# Patient Record
Sex: FEMALE | Race: WHITE | NOT HISPANIC OR LATINO | Employment: FULL TIME | URBAN - NONMETROPOLITAN AREA
[De-identification: names, ages, dates, MRNs, and addresses within clinical notes are randomized per-mention and may not be internally consistent; named-entity substitution may affect disease eponyms.]

---

## 2018-07-08 ENCOUNTER — HOSPITAL ENCOUNTER (INPATIENT)
Facility: HOSPITAL | Age: 38
LOS: 2 days | Discharge: HOME | End: 2018-07-10
Attending: EMERGENCY MEDICINE | Admitting: INTERNAL MEDICINE

## 2018-07-08 ENCOUNTER — APPOINTMENT (OUTPATIENT)
Dept: GENERAL RADIOLOGY | Facility: HOSPITAL | Age: 38
End: 2018-07-08

## 2018-07-08 DIAGNOSIS — A41.9 SEPSIS SECONDARY TO UTI (HCC): Primary | ICD-10-CM

## 2018-07-08 DIAGNOSIS — N39.0 SEPSIS SECONDARY TO UTI (HCC): Primary | ICD-10-CM

## 2018-07-08 LAB
6-ACETYL MORPHINE: NEGATIVE
ALBUMIN SERPL-MCNC: 3.4 G/DL (ref 3.5–5)
ALBUMIN/GLOB SERPL: 1.2 G/DL (ref 1.5–2.5)
ALP SERPL-CCNC: 248 U/L (ref 35–104)
ALT SERPL W P-5'-P-CCNC: 113 U/L (ref 10–36)
AMPHET+METHAMPHET UR QL: NEGATIVE
ANION GAP SERPL CALCULATED.3IONS-SCNC: 6 MMOL/L (ref 3.6–11.2)
AST SERPL-CCNC: 94 U/L (ref 10–30)
B-HCG UR QL: NEGATIVE
BACTERIA UR QL AUTO: ABNORMAL /HPF
BARBITURATES UR QL SCN: NEGATIVE
BENZODIAZ UR QL SCN: NEGATIVE
BILIRUB SERPL-MCNC: 0.9 MG/DL (ref 0.2–1.8)
BILIRUB UR QL STRIP: NEGATIVE
BUN BLD-MCNC: 16 MG/DL (ref 7–21)
BUN/CREAT SERPL: 21.1 (ref 7–25)
BUPRENORPHINE SERPL-MCNC: POSITIVE NG/ML
CALCIUM SPEC-SCNC: 8.5 MG/DL (ref 7.7–10)
CANNABINOIDS SERPL QL: NEGATIVE
CHLORIDE SERPL-SCNC: 108 MMOL/L (ref 99–112)
CLARITY UR: ABNORMAL
CO2 SERPL-SCNC: 24 MMOL/L (ref 24.3–31.9)
COCAINE UR QL: NEGATIVE
COLOR UR: ABNORMAL
CREAT BLD-MCNC: 0.76 MG/DL (ref 0.43–1.29)
CRP SERPL-MCNC: 10.73 MG/DL (ref 0–0.99)
D-LACTATE SERPL-SCNC: 1.2 MMOL/L (ref 0.5–2)
D-LACTATE SERPL-SCNC: 2.6 MMOL/L (ref 0.5–2)
DEPRECATED RDW RBC AUTO: 47.3 FL (ref 37–54)
ERYTHROCYTE [DISTWIDTH] IN BLOOD BY AUTOMATED COUNT: 13.9 % (ref 11.5–14.5)
ETHANOL BLD-MCNC: <10 MG/DL
ETHANOL UR QL: <0.01 %
FLUAV AG NPH QL: NEGATIVE
FLUBV AG NPH QL IA: NEGATIVE
GFR SERPL CREATININE-BSD FRML MDRD: 87 ML/MIN/1.73
GLOBULIN UR ELPH-MCNC: 2.8 GM/DL
GLUCOSE BLD-MCNC: 110 MG/DL (ref 70–110)
GLUCOSE UR STRIP-MCNC: NEGATIVE MG/DL
HAV IGM SERPL QL IA: ABNORMAL
HBV CORE IGM SERPL QL IA: ABNORMAL
HBV SURFACE AG SERPL QL IA: ABNORMAL
HCT VFR BLD AUTO: 36.5 % (ref 37–47)
HCV AB SER DONR QL: REACTIVE
HETEROPH AB SER QL LA: NEGATIVE
HGB BLD-MCNC: 12.4 G/DL (ref 12–16)
HGB UR QL STRIP.AUTO: ABNORMAL
HIV1+2 AB SER QL: NORMAL
HOLD SPECIMEN: NORMAL
HYALINE CASTS UR QL AUTO: ABNORMAL /LPF
KETONES UR QL STRIP: NEGATIVE
LEUKOCYTE ESTERASE UR QL STRIP.AUTO: ABNORMAL
LYMPHOCYTES # BLD MANUAL: 0.63 10*3/MM3 (ref 1–3)
LYMPHOCYTES NFR BLD MANUAL: 6 % (ref 21–51)
MAGNESIUM SERPL-MCNC: 1.7 MG/DL (ref 1.7–2.6)
MCH RBC QN AUTO: 31.9 PG (ref 27–33)
MCHC RBC AUTO-ENTMCNC: 34 G/DL (ref 33–37)
MCV RBC AUTO: 93.8 FL (ref 80–94)
METHADONE UR QL SCN: NEGATIVE
NEUTROPHILS # BLD AUTO: 9.79 10*3/MM3 (ref 1.4–6.5)
NEUTROPHILS NFR BLD MANUAL: 67 % (ref 30–70)
NEUTS BAND NFR BLD MANUAL: 27 % (ref 4–12)
NITRITE UR QL STRIP: POSITIVE
OPIATES UR QL: NEGATIVE
OSMOLALITY SERPL CALC.SUM OF ELEC: 277.5 MOSM/KG (ref 273–305)
OXYCODONE UR QL SCN: NEGATIVE
PCP UR QL SCN: NEGATIVE
PH UR STRIP.AUTO: 5.5 [PH] (ref 5–8)
PHOSPHATE SERPL-MCNC: 2.4 MG/DL (ref 2.7–4.5)
PLAT MORPH BLD: NORMAL
PLATELET # BLD AUTO: 123 10*3/MM3 (ref 130–400)
PMV BLD AUTO: 11 FL (ref 6–10)
POTASSIUM BLD-SCNC: 3.8 MMOL/L (ref 3.5–5.3)
PROT SERPL-MCNC: 6.2 G/DL (ref 6–8)
PROT UR QL STRIP: ABNORMAL
RBC # BLD AUTO: 3.89 10*6/MM3 (ref 4.2–5.4)
RBC # UR: ABNORMAL /HPF
RBC MORPH BLD: NORMAL
REF LAB TEST METHOD: ABNORMAL
S PYO AG THROAT QL: NEGATIVE
SCAN SLIDE: NORMAL
SODIUM BLD-SCNC: 138 MMOL/L (ref 135–153)
SP GR UR STRIP: 1.01 (ref 1–1.03)
SQUAMOUS #/AREA URNS HPF: ABNORMAL /HPF
T4 FREE SERPL-MCNC: 1.04 NG/DL (ref 0.89–1.76)
TROPONIN I SERPL-MCNC: <0.006 NG/ML
TSH SERPL DL<=0.05 MIU/L-ACNC: 0.91 MIU/ML (ref 0.55–4.78)
UROBILINOGEN UR QL STRIP: ABNORMAL
WBC NRBC COR # BLD: 10.42 10*3/MM3 (ref 4.5–12.5)
WBC UR QL AUTO: ABNORMAL /HPF
WHOLE BLOOD HOLD SPECIMEN: NORMAL
WHOLE BLOOD HOLD SPECIMEN: NORMAL

## 2018-07-08 PROCEDURE — 80074 ACUTE HEPATITIS PANEL: CPT | Performed by: EMERGENCY MEDICINE

## 2018-07-08 PROCEDURE — 87150 DNA/RNA AMPLIFIED PROBE: CPT | Performed by: EMERGENCY MEDICINE

## 2018-07-08 PROCEDURE — 71045 X-RAY EXAM CHEST 1 VIEW: CPT

## 2018-07-08 PROCEDURE — 94799 UNLISTED PULMONARY SVC/PX: CPT

## 2018-07-08 PROCEDURE — 87077 CULTURE AEROBIC IDENTIFY: CPT | Performed by: EMERGENCY MEDICINE

## 2018-07-08 PROCEDURE — 25010000002 PIPERACILLIN-TAZOBACTAM: Performed by: EMERGENCY MEDICINE

## 2018-07-08 PROCEDURE — 93005 ELECTROCARDIOGRAM TRACING: CPT | Performed by: EMERGENCY MEDICINE

## 2018-07-08 PROCEDURE — 99285 EMERGENCY DEPT VISIT HI MDM: CPT

## 2018-07-08 PROCEDURE — 84484 ASSAY OF TROPONIN QUANT: CPT | Performed by: EMERGENCY MEDICINE

## 2018-07-08 PROCEDURE — 80307 DRUG TEST PRSMV CHEM ANLYZR: CPT | Performed by: EMERGENCY MEDICINE

## 2018-07-08 PROCEDURE — 81025 URINE PREGNANCY TEST: CPT | Performed by: EMERGENCY MEDICINE

## 2018-07-08 PROCEDURE — 25010000002 KETOROLAC TROMETHAMINE PER 15 MG: Performed by: EMERGENCY MEDICINE

## 2018-07-08 PROCEDURE — G0432 EIA HIV-1/HIV-2 SCREEN: HCPCS | Performed by: EMERGENCY MEDICINE

## 2018-07-08 PROCEDURE — 85007 BL SMEAR W/DIFF WBC COUNT: CPT | Performed by: EMERGENCY MEDICINE

## 2018-07-08 PROCEDURE — 87880 STREP A ASSAY W/OPTIC: CPT | Performed by: EMERGENCY MEDICINE

## 2018-07-08 PROCEDURE — 84443 ASSAY THYROID STIM HORMONE: CPT | Performed by: EMERGENCY MEDICINE

## 2018-07-08 PROCEDURE — 87186 SC STD MICRODIL/AGAR DIL: CPT | Performed by: EMERGENCY MEDICINE

## 2018-07-08 PROCEDURE — 83605 ASSAY OF LACTIC ACID: CPT | Performed by: EMERGENCY MEDICINE

## 2018-07-08 PROCEDURE — 25010000002 VANCOMYCIN PER 500 MG: Performed by: EMERGENCY MEDICINE

## 2018-07-08 PROCEDURE — 87081 CULTURE SCREEN ONLY: CPT | Performed by: EMERGENCY MEDICINE

## 2018-07-08 PROCEDURE — 25010000002 VANCOMYCIN PER 500 MG

## 2018-07-08 PROCEDURE — 86308 HETEROPHILE ANTIBODY SCREEN: CPT | Performed by: EMERGENCY MEDICINE

## 2018-07-08 PROCEDURE — 80053 COMPREHEN METABOLIC PANEL: CPT | Performed by: EMERGENCY MEDICINE

## 2018-07-08 PROCEDURE — 71045 X-RAY EXAM CHEST 1 VIEW: CPT | Performed by: RADIOLOGY

## 2018-07-08 PROCEDURE — 81001 URINALYSIS AUTO W/SCOPE: CPT | Performed by: EMERGENCY MEDICINE

## 2018-07-08 PROCEDURE — 84439 ASSAY OF FREE THYROXINE: CPT | Performed by: EMERGENCY MEDICINE

## 2018-07-08 PROCEDURE — 25010000002 PIPERACILLIN-TAZOBACTAM

## 2018-07-08 PROCEDURE — C1751 CATH, INF, PER/CENT/MIDLINE: HCPCS

## 2018-07-08 PROCEDURE — 85025 COMPLETE CBC W/AUTO DIFF WBC: CPT | Performed by: EMERGENCY MEDICINE

## 2018-07-08 PROCEDURE — 36556 INSERT NON-TUNNEL CV CATH: CPT | Performed by: SURGERY

## 2018-07-08 PROCEDURE — 86140 C-REACTIVE PROTEIN: CPT | Performed by: EMERGENCY MEDICINE

## 2018-07-08 PROCEDURE — 87086 URINE CULTURE/COLONY COUNT: CPT | Performed by: EMERGENCY MEDICINE

## 2018-07-08 PROCEDURE — 84100 ASSAY OF PHOSPHORUS: CPT | Performed by: INTERNAL MEDICINE

## 2018-07-08 PROCEDURE — 83735 ASSAY OF MAGNESIUM: CPT | Performed by: EMERGENCY MEDICINE

## 2018-07-08 PROCEDURE — 99252 IP/OBS CONSLTJ NEW/EST SF 35: CPT | Performed by: SURGERY

## 2018-07-08 PROCEDURE — 25010000002 HEPARIN (PORCINE) PER 1000 UNITS: Performed by: PHYSICIAN ASSISTANT

## 2018-07-08 PROCEDURE — 99223 1ST HOSP IP/OBS HIGH 75: CPT | Performed by: INTERNAL MEDICINE

## 2018-07-08 PROCEDURE — 25010000002 ONDANSETRON PER 1 MG: Performed by: EMERGENCY MEDICINE

## 2018-07-08 PROCEDURE — 05H633Z INSERTION OF INFUSION DEVICE INTO LEFT SUBCLAVIAN VEIN, PERCUTANEOUS APPROACH: ICD-10-PCS | Performed by: SURGERY

## 2018-07-08 PROCEDURE — 87804 INFLUENZA ASSAY W/OPTIC: CPT | Performed by: EMERGENCY MEDICINE

## 2018-07-08 PROCEDURE — 87040 BLOOD CULTURE FOR BACTERIA: CPT | Performed by: EMERGENCY MEDICINE

## 2018-07-08 RX ORDER — SODIUM CHLORIDE 0.9 % (FLUSH) 0.9 %
10 SYRINGE (ML) INJECTION AS NEEDED
Status: DISCONTINUED | OUTPATIENT
Start: 2018-07-08 | End: 2018-07-10 | Stop reason: HOSPADM

## 2018-07-08 RX ORDER — SODIUM CHLORIDE 0.9 % (FLUSH) 0.9 %
20 SYRINGE (ML) INJECTION AS NEEDED
Status: DISCONTINUED | OUTPATIENT
Start: 2018-07-08 | End: 2018-07-10 | Stop reason: HOSPADM

## 2018-07-08 RX ORDER — KETOROLAC TROMETHAMINE 30 MG/ML
30 INJECTION, SOLUTION INTRAMUSCULAR; INTRAVENOUS ONCE
Status: COMPLETED | OUTPATIENT
Start: 2018-07-08 | End: 2018-07-08

## 2018-07-08 RX ORDER — MAGNESIUM SULFATE HEPTAHYDRATE 40 MG/ML
2 INJECTION, SOLUTION INTRAVENOUS AS NEEDED
Status: DISCONTINUED | OUTPATIENT
Start: 2018-07-08 | End: 2018-07-10 | Stop reason: HOSPADM

## 2018-07-08 RX ORDER — HYDROXYZINE 50 MG/1
50 TABLET, FILM COATED ORAL EVERY 6 HOURS PRN
Status: DISCONTINUED | OUTPATIENT
Start: 2018-07-08 | End: 2018-07-10 | Stop reason: HOSPADM

## 2018-07-08 RX ORDER — SODIUM CHLORIDE 9 MG/ML
100 INJECTION, SOLUTION INTRAVENOUS CONTINUOUS
Status: DISCONTINUED | OUTPATIENT
Start: 2018-07-08 | End: 2018-07-10 | Stop reason: HOSPADM

## 2018-07-08 RX ORDER — ACETAMINOPHEN 325 MG/1
650 TABLET ORAL EVERY 6 HOURS PRN
Status: DISCONTINUED | OUTPATIENT
Start: 2018-07-08 | End: 2018-07-10 | Stop reason: HOSPADM

## 2018-07-08 RX ORDER — ACETAMINOPHEN 325 MG/1
975 TABLET ORAL ONCE
Status: COMPLETED | OUTPATIENT
Start: 2018-07-08 | End: 2018-07-08

## 2018-07-08 RX ORDER — IBUPROFEN 400 MG/1
400 TABLET ORAL EVERY 6 HOURS PRN
Status: DISCONTINUED | OUTPATIENT
Start: 2018-07-08 | End: 2018-07-10 | Stop reason: HOSPADM

## 2018-07-08 RX ORDER — POTASSIUM CHLORIDE 7.45 MG/ML
10 INJECTION INTRAVENOUS
Status: DISCONTINUED | OUTPATIENT
Start: 2018-07-08 | End: 2018-07-10 | Stop reason: HOSPADM

## 2018-07-08 RX ORDER — SODIUM CHLORIDE 0.9 % (FLUSH) 0.9 %
1-10 SYRINGE (ML) INJECTION AS NEEDED
Status: DISCONTINUED | OUTPATIENT
Start: 2018-07-08 | End: 2018-07-10 | Stop reason: HOSPADM

## 2018-07-08 RX ORDER — NICOTINE 21 MG/24HR
1 PATCH, TRANSDERMAL 24 HOURS TRANSDERMAL DAILY
Status: DISCONTINUED | OUTPATIENT
Start: 2018-07-08 | End: 2018-07-10 | Stop reason: HOSPADM

## 2018-07-08 RX ORDER — ONDANSETRON 2 MG/ML
4 INJECTION INTRAMUSCULAR; INTRAVENOUS ONCE
Status: COMPLETED | OUTPATIENT
Start: 2018-07-08 | End: 2018-07-08

## 2018-07-08 RX ORDER — MAGNESIUM SULFATE HEPTAHYDRATE 40 MG/ML
4 INJECTION, SOLUTION INTRAVENOUS ONCE
Status: COMPLETED | OUTPATIENT
Start: 2018-07-08 | End: 2018-07-09

## 2018-07-08 RX ORDER — POTASSIUM CHLORIDE 20 MEQ/1
40 TABLET, EXTENDED RELEASE ORAL AS NEEDED
Status: DISCONTINUED | OUTPATIENT
Start: 2018-07-08 | End: 2018-07-10 | Stop reason: HOSPADM

## 2018-07-08 RX ORDER — MAGNESIUM SULFATE HEPTAHYDRATE 40 MG/ML
4 INJECTION, SOLUTION INTRAVENOUS AS NEEDED
Status: DISCONTINUED | OUTPATIENT
Start: 2018-07-08 | End: 2018-07-10 | Stop reason: HOSPADM

## 2018-07-08 RX ORDER — POTASSIUM CHLORIDE 1.5 G/1.77G
40 POWDER, FOR SOLUTION ORAL AS NEEDED
Status: DISCONTINUED | OUTPATIENT
Start: 2018-07-08 | End: 2018-07-10 | Stop reason: HOSPADM

## 2018-07-08 RX ORDER — HEPARIN SODIUM 5000 [USP'U]/ML
5000 INJECTION, SOLUTION INTRAVENOUS; SUBCUTANEOUS EVERY 12 HOURS SCHEDULED
Status: DISCONTINUED | OUTPATIENT
Start: 2018-07-08 | End: 2018-07-10 | Stop reason: HOSPADM

## 2018-07-08 RX ADMIN — SODIUM CHLORIDE 1770 ML: 9 INJECTION, SOLUTION INTRAVENOUS at 08:12

## 2018-07-08 RX ADMIN — MAGNESIUM SULFATE IN WATER 4 G: 40 INJECTION, SOLUTION INTRAVENOUS at 23:13

## 2018-07-08 RX ADMIN — SODIUM CHLORIDE 150 ML/HR: 9 INJECTION, SOLUTION INTRAVENOUS at 19:20

## 2018-07-08 RX ADMIN — ONDANSETRON 4 MG: 2 INJECTION, SOLUTION INTRAMUSCULAR; INTRAVENOUS at 08:13

## 2018-07-08 RX ADMIN — HEPARIN SODIUM 5000 UNITS: 5000 INJECTION, SOLUTION INTRAVENOUS; SUBCUTANEOUS at 13:18

## 2018-07-08 RX ADMIN — SODIUM CHLORIDE 150 ML/HR: 9 INJECTION, SOLUTION INTRAVENOUS at 09:20

## 2018-07-08 RX ADMIN — VANCOMYCIN HYDROCHLORIDE 1250 MG: 5 INJECTION, POWDER, LYOPHILIZED, FOR SOLUTION INTRAVENOUS at 09:45

## 2018-07-08 RX ADMIN — NICOTINE 1 PATCH: 21 PATCH TRANSDERMAL at 19:55

## 2018-07-08 RX ADMIN — SODIUM CHLORIDE 1000 ML: 9 INJECTION, SOLUTION INTRAVENOUS at 13:12

## 2018-07-08 RX ADMIN — KETOROLAC TROMETHAMINE 30 MG: 30 INJECTION, SOLUTION INTRAMUSCULAR; INTRAVENOUS at 08:13

## 2018-07-08 RX ADMIN — HYDROXYZINE HYDROCHLORIDE 50 MG: 50 TABLET ORAL at 23:30

## 2018-07-08 RX ADMIN — VANCOMYCIN HYDROCHLORIDE 750 MG: 5 INJECTION, POWDER, LYOPHILIZED, FOR SOLUTION INTRAVENOUS at 18:49

## 2018-07-08 RX ADMIN — SODIUM CHLORIDE 150 ML/HR: 9 INJECTION, SOLUTION INTRAVENOUS at 10:47

## 2018-07-08 RX ADMIN — TAZOBACTAM SODIUM AND PIPERACILLIN SODIUM 4.5 G: .5; 4 INJECTION, POWDER, LYOPHILIZED, FOR SOLUTION INTRAVENOUS at 18:01

## 2018-07-08 RX ADMIN — HEPARIN SODIUM 5000 UNITS: 5000 INJECTION, SOLUTION INTRAVENOUS; SUBCUTANEOUS at 21:13

## 2018-07-08 RX ADMIN — ACETAMINOPHEN 650 MG: 325 TABLET, FILM COATED ORAL at 23:30

## 2018-07-08 RX ADMIN — IBUPROFEN 400 MG: 400 TABLET, FILM COATED ORAL at 19:58

## 2018-07-08 RX ADMIN — TAZOBACTAM SODIUM AND PIPERACILLIN SODIUM 4.5 G: .5; 4 INJECTION, POWDER, LYOPHILIZED, FOR SOLUTION INTRAVENOUS at 08:51

## 2018-07-08 RX ADMIN — ACETAMINOPHEN 975 MG: 325 TABLET, FILM COATED ORAL at 08:13

## 2018-07-08 NOTE — PROCEDURES
Preop needs IV access for pressors    Postop same    Procedure place left subclavian central line    The left chest and neck were prepped and draped. The clavicle area injected with local. The vein accessed with a long needle and the wire threaded in easily. The dilator was placed and then the catheter sutured in at 18 cm. All ports had good blood return and flushed easily. The dressing was applied and an xray ordered.

## 2018-07-08 NOTE — CONSULTS
Consults    Patient Care Team:  PAPITO Pacheco as PCP - General  PAPITO Pacheco as PCP - Family Medicine    Chief complaint: hypotension, IV drug use    Subjective     History of Present Illness She is a 29 yo IV suboxone user who was admitted with fever, leg pain, and hypotension, who needs a central line for infusion of pressors since her blood pressure has remained low in spite of fluid boluses. She is not on any anticoagulants and has no past history off central lines or fractured clavicle/ribs.     Review of Systems   Constitutional: Positive for fatigue and fever. Negative for activity change, appetite change, chills and unexpected weight change.   HENT: Negative for congestion, facial swelling and sore throat.    Eyes: Negative for photophobia and visual disturbance.   Respiratory: Negative for chest tightness, shortness of breath and wheezing.    Cardiovascular: Negative for chest pain, palpitations and leg swelling.   Gastrointestinal: Negative for abdominal distention, abdominal pain, anal bleeding, blood in stool, constipation, diarrhea, nausea, rectal pain and vomiting.   Endocrine: Negative for cold intolerance, heat intolerance, polydipsia and polyuria.   Genitourinary: Negative for difficulty urinating, dysuria, flank pain and urgency.   Musculoskeletal: Positive for arthralgias and myalgias. Negative for back pain.   Skin: Negative for rash and wound.   Allergic/Immunologic: Negative for immunocompromised state.   Neurological: Negative for dizziness, seizures, syncope, light-headedness, numbness and headaches.   Hematological: Negative for adenopathy. Does not bruise/bleed easily.   Psychiatric/Behavioral: Negative for behavioral problems and confusion. The patient is not nervous/anxious.         Past Medical History:   Diagnosis Date   • Hepatitis C virus    , History reviewed. No pertinent surgical history., History reviewed. No pertinent family history.,   Social History    Substance Use Topics   • Smoking status: Current Every Day Smoker     Packs/day: 0.25     Years: 10.00     Types: Cigarettes   • Smokeless tobacco: Never Used   • Alcohol use Not on file   ,   No prescriptions prior to admission.   , Scheduled Meds:    heparin (porcine) 5,000 Units Subcutaneous Q12H   piperacillin-tazobactam 4.5 g Intravenous Q8H   sodium chloride 1,000 mL Intravenous Once   vancomycin 750 mg Intravenous Q8H   , Continuous Infusions:    norepinephrine 0.02-0.3 mcg/kg/min    Pharmacy Consult - Pharmacy to dose     Pharmacy Consult - Pharmacy to dose     sodium chloride 150 mL/hr Last Rate: 150 mL/hr (07/08/18 1047)   , PRN Meds:  Pharmacy Consult - Pharmacy to dose  •  Pharmacy Consult - Pharmacy to dose  •  pneumococcal polysaccharide 23-valent  •  sodium chloride  •  sodium chloride and Allergies:  Patient has no known allergies.    Objective      Vital Signs  Temp:  [97.5 °F (36.4 °C)-103 °F (39.4 °C)] 97.5 °F (36.4 °C)  Heart Rate:  [] 69  Resp:  [9-74] 18  BP: ()/(38-85) 106/85    Physical Exam   Constitutional: She is oriented to person, place, and time. She appears well-developed and well-nourished. She does not appear ill. No distress.   HENT:   Head: Normocephalic. Head is without laceration. Hair is normal.   Right Ear: Hearing and ear canal normal.   Left Ear: Hearing and ear canal normal.   Nose: Nose normal. No sinus tenderness. No epistaxis. Right sinus exhibits no maxillary sinus tenderness and no frontal sinus tenderness. Left sinus exhibits no maxillary sinus tenderness and no frontal sinus tenderness.   Eyes: Conjunctivae and lids are normal. Pupils are equal, round, and reactive to light.   Neck: Normal range of motion. No JVD present. No tracheal tenderness present. No tracheal deviation present. No thyroid mass and no thyromegaly present.   Cardiovascular: Normal rate and regular rhythm.  Exam reveals no gallop.    No murmur heard.  Pulmonary/Chest: Effort normal  and breath sounds normal. No stridor. She has no wheezes. She exhibits no tenderness.   Abdominal: Soft. Bowel sounds are normal. She exhibits no distension, no ascites and no mass. There is no tenderness. There is no rebound and no guarding. No hernia.   Musculoskeletal: She exhibits tenderness. She exhibits no edema or deformity.   Lymphadenopathy:     She has no cervical adenopathy.     She has no axillary adenopathy.        Right: No inguinal and no supraclavicular adenopathy present.        Left: No inguinal and no supraclavicular adenopathy present.   Neurological: She is alert and oriented to person, place, and time. She exhibits normal muscle tone.   Skin: Skin is warm, dry and intact. No rash noted. No erythema. No pallor.   Psychiatric: She has a normal mood and affect. Her behavior is normal. Thought content normal.   Vitals reviewed.      Results Review:    I reviewed the patient's new clinical results.  I reviewed the patient's other test results and agree with the interpretation        Assessment/Plan  place subclavian central line for pressors    Active Problems:    Sepsis (CMS/[facility])      Assessment & Plan    I discussed the patients findings and my recommendations with patient, nursing staff and consulting provider    Babatunde Salinas MD  07/08/18  3:53 PM    Time:

## 2018-07-08 NOTE — NURSING NOTE
Patient's SBP 70's-90's; Dr. Cervantes notified; new order entered per Dr. Cervantes for a 1,000 ml fluid bolus.  Manual BP also obtained for comparison: 82/62, right arm.  Patient is asymptomatic.  Denies pain/discomfort.  Verbalizes feeling tired and weak.

## 2018-07-08 NOTE — PROGRESS NOTES
THC Physician - Brief Progress Note  PERMANENT  07/08/2018 11:29    [facility]  [facility] - U - 10 - [place], [place] ([facility])    FUNMI TOM    Date of Service 07/08/2018 11:29    HPI/Events of Note AICU Provider Assessment Note  38 yo with IV drug use history presents with sepsis associated with hypotension and fever  UA consistent with UTI  Lactic Acid 2.6  CXR clear lungs    Assessment and Plan:  Sepsis presenting with hypotension and fever- NOT requiring Vasopressors  - UTI potential source- cultures completed and antibiotics started- Zosyn  - Staph Coverage with vancomycin has been initiated empiricallly pending culture results  - 30 ml/kg fluid bolus given    Drug use with UDS positive for buprenorphine- monitor for opiod withdrawl and treat as needed    x_____   Video Assessment performed  x_____   Most recent labs reviewed  x_____   Vital Signs reviewed  x_____   Best Practices addressed:                 VTE prophylaxis: Heparin                 SUP (when indicated):                 Glycemic control:                      Please notify bedside physician when present or [facility] if glc > 180 X 2                 Sepsis guidelines:                 Lung protective strategy:    _____     Spoke with bedside RN  _____     Orders written      Contact [facility] for any needs if bedside physician is not present.      Interventions Major-Infection - evaluation and management        Electronically Signed by: Monique Lopez) on 07/08/2018 12:41

## 2018-07-08 NOTE — H&P
[facility] HOSPITALIST HISTORY AND PHYSICAL    Patient Identification:  Name:  Emely Gamble  Age:  37 y.o.  Sex:  female  :  1980  MRN:  [id number]   Visit Number:  [id number]  Primary Care Physician:  PAPITO Pacheco     2018  7:34 AM    Subjective     Chief complaint:   Chief Complaint   Patient presents with   • Fever   • Leg Pain     History of presenting illness:   Ms. De Oliveira is a 37 y.o. female with past medical history significant for hepatitis C and IV drug abuse. She presented to the emergency department of [facility] on 2018 with complaints of aching pains in both of her legs. She had one episode about a week ago that resolved on its own and hadn't experienced any further until the last 2 days. She denies any recent or previous history of injury or surgery to the lower back or lower extremities. No appreciated redness or rash. No history of DVT or PE. No swelling of the lower extremities, recent long distance travel, or hormone replacement therapy. She has also been experiencing generalized weakness and fatigue for the last 2-3 days. She has had a fever, which was up to 103F in the ED, as well as chills. No cough, shortness of breath, chest pains or discomfort, abdominal pains, or diarrhea. She had an episode of nausea and vomiting yesterday, which has since resolved and she has tolerated food well since then. No recent insect/tick bites that she is aware of. She reports a history of recurrent urinary tract infections, with the most recent being about 1 month ago. She states that she didn't take her antibiotics appropriately. She denies any dysuria, frequency, hematuria, or flank pain. She does have urgency. She does admit to IV drug abuse. In the past, she has used methamphetamines, but denies this recently. She does injection IV Suboxone, which is not prescribed to her. She injects into her left arm and denies any recent pain or swelling of the  "area. She does not always use clean needles, but does not share needles with others. No known history of endocarditis or blood stream infection. She notes a mild headache in her temples, but it is not severe and is more of an aggravation. Loud noises do not bother her, but the lights do bother her eyes a bit and she is more comfortable with the overhead light off. Light coming through the windows does not bother her.     Upon arrival to the ED, vitals were /58, , RR 20, temperature 103F, and O2sat 95% on room air. Her BP did drop down to 74/38 and is now up to 88/59. CMP revealed elevated alkaline phosphatase at 248, ALT at 113, and AST at 94. Lactic acid is elevated at 2.6 with reflex pending. CRP is up at 10.73. WBC count is normal at 10.42. Absolute neutrophil count is elevated a 9.79 and there is 27% bands. Influenza A and B are negative. Monospot and screen for HIV-1/HIV-2 are negative. Strep screen is also negative. UA reveals dark yellow, cloudy urine. It is positive for nitrite, 1+ leukocyte esterase, trace of protein. WBCs are too numerous to count. There is 4+ bacteria and only 0-2 RBCs and squamous epithelial cells. Urine drug screen is positive for buprenorphine. CXR is unremarkable per radiology. Patient has been admitted to the critical care unit of [facility] for further evaluation and therapy.     Helen: Pt seen and examined in the CCU with ASHU Chanel. States she feels slightly improved from this AM. Continues to report myalgias in her legs. Denies CP, dyspnea or palpitations. Denies any symptoms of dysuria. Denies diarrhea. She states she was diagnosed with a UTI approx 1 month ago but \"did not take the antibiotic like I was supposed to.\" She provides me with many of the same details as outlined above.     ---------------------------------------------------------------------------------------------------------------------   Review of Systems   Constitutional: Positive for " chills, fatigue and fever.   HENT: Negative for congestion and nosebleeds.         No phonophobia.    Eyes: Positive for photophobia (Mild. ). Negative for visual disturbance.   Respiratory: Negative for cough, chest tightness, shortness of breath and wheezing.    Cardiovascular: Negative for chest pain, palpitations and leg swelling.   Gastrointestinal: Positive for nausea and vomiting. Negative for abdominal distention, abdominal pain, constipation and diarrhea.   Genitourinary: Positive for urgency. Negative for difficulty urinating, dysuria, flank pain, frequency, hematuria and pelvic pain.   Musculoskeletal: Positive for myalgias. Negative for back pain, neck pain and neck stiffness.   Skin: Negative for color change, pallor, rash and wound.   Neurological: Positive for weakness (Generalized. ) and headaches. Negative for dizziness and syncope.   Psychiatric/Behavioral: Negative for behavioral problems and confusion.    ---------------------------------------------------------------------------------------------------------------------   Past Medical History:   Diagnosis Date   • Hepatitis C virus      History reviewed. No pertinent surgical history.  History reviewed. No pertinent family history.  Social History     Social History   • Marital status: Single     Social History Main Topics   • Drug use: Yes     Types: IV     Other Topics Concern   • Not on file   ---------------------------------------------------------------------------------------------------------------------   Allergies:  Patient has no known allergies.  ---------------------------------------------------------------------------------------------------------------------   Prior to Admission Medications     None      ---------------------------------------------------------------------------------------------------------------------  Objective   Hospital Scheduled Meds:    sodium chloride 1,000 mL Intravenous Once       sodium chloride 150  mL/hr Last Rate: Stopped (07/08/18 0945)     ---------------------------------------------------------------------------------------------------------------------   Vital Signs:  Temp:  [98.7 °F (37.1 °C)-103 °F (39.4 °C)] 98.7 °F (37.1 °C)  Heart Rate:  [] 80  Resp:  [16-22] 18  BP: ()/(38-75) 88/59  Mean Arterial Pressure (Non-Invasive) for the past 24 hrs (Last 3 readings):   Noninvasive MAP (mmHg)   07/08/18 1012 67   07/08/18 1009 60   07/08/18 1002 53     SpO2 Percentage    07/08/18 1002 07/08/18 1009 07/08/18 1012   SpO2: 100% 100% 100%     SpO2:  [90 %-100 %] 100 %  on  Flow (L/min):  [2] 2;   Device (Oxygen Therapy): nasal cannula    Body mass index is 20.98 kg/m².  Wt Readings from Last 3 Encounters:   07/08/18 59 kg (130 lb)     ---------------------------------------------------------------------------------------------------------------------   Physical Exam:  Constitutional:  Well-developed and well-nourished.  No respiratory distress.      HENT:  Head: Normocephalic and atraumatic.  Mouth:  Moist mucous membranes.    Eyes:  Conjunctivae and EOM are normal. No scleral icterus. No erythema or drainage.   Neck:  Neck supple.  No JVD present. No pain with flexion or extension.   Cardiovascular:  Normal rate, regular rhythm and normal heart sounds with no murmur.  Pulmonary/Chest:  No respiratory distress, no wheezes, no crackles, with normal breath sounds and good air movement.  Abdominal:  Soft.  Bowel sounds are present x 4.  No distension and no tenderness. No CVA tenderness.   Musculoskeletal:  No edema, no tenderness, and no deformity.  No red or swollen joints anywhere. Spine is non-tender. No abnormality appreciated in the joints.   Neurological:  Alert and oriented to person, place, and time. CN's II-XII grossly intact.  No tongue deviation.  No facial droop.  No slurred speech.   Skin:  Skin is warm and dry.  No rash noted.  No pallor. Marks from prior injections noted in the  antecubital fossa on the left. No erythema or evidence of abscess. No stigmata of endocarditis appreciated on her nails, hands, feet.   Psychiatric:  Normal mood and affect.  Behavior is normal.   Peripheral vascular:  No edema and 2+ pedal pulses bilaterally.   Genitourinary: No nelson catheter.   ---------------------------------------------------------------------------------------------------------------------  EKG:  Pending cardiology interpretation. Per my read, reveals sinus rhythm 88bpm with intermittent monomorphic PVCs, incomplete RBBB. QTc 433ms.     Telemetry:  Sinus rhythm in the 70s.    I have personally reviewed the EKG/Telemetry strip.  ---------------------------------------------------------------------------------------------------------------------     Results from last 7 days  Lab Units 07/08/18  0755   TROPONIN I ng/mL <0.006           Results from last 7 days  Lab Units 07/08/18  0755   CRP mg/dL 10.73*   LACTATE mmol/L 2.6*   WBC 10*3/mm3 10.42   HEMOGLOBIN g/dL 12.4   HEMATOCRIT % 36.5*   MCV fL 93.8   MCHC g/dL 34.0   PLATELETS 10*3/mm3 123*     Results from last 7 days  Lab Units 07/08/18  0755   SODIUM mmol/L 138   POTASSIUM mmol/L 3.8   MAGNESIUM mg/dL 1.7   CHLORIDE mmol/L 108   CO2 mmol/L 24.0*   BUN mg/dL 16   CREATININE mg/dL 0.76   EGFR IF NONAFRICN AM mL/min/1.73 87   CALCIUM mg/dL 8.5   GLUCOSE mg/dL 110   ALBUMIN g/dL 3.40*   BILIRUBIN mg/dL 0.9   ALK PHOS U/L 248*   AST (SGOT) U/L 94*   ALT (SGPT) U/L 113*   Estimated Creatinine Clearance: 96.2 mL/min (by C-G formula based on SCr of 0.76 mg/dL).    Lab Results   Component Value Date    TSH 0.913 07/08/2018    FREET4 1.04 07/08/2018     Results for FUNMI TOM (MRN [id number]) as of 7/8/2018 10:12   Ref. Range 7/8/2018 08:14   Color, UA Latest Ref Range: Yellow, Straw  Dark Yellow (A)   Appearance, UA Latest Ref Range: Clear  Cloudy (A)   Specific Ding[place], UA Latest Ref Range: 1.005 - 1.030  1.013   pH, UA Latest Ref Range:  5.0 - 8.0  5.5   Glucose, UA Latest Ref Range: Negative  Negative   Ketones, UA Latest Ref Range: Negative  Negative   Blood, UA Latest Ref Range: Negative  Trace (A)   Nitrite, UA Latest Ref Range: Negative  Positive (A)   Leuk Esterase, UA Latest Ref Range: Negative  Small (1+) (A)   Protein, UA Latest Ref Range: Negative  Trace (A)   Bilirubin, UA Latest Ref Range: Negative  Negative   Urobilinogen, UA Latest Ref Range: 0.2 - 1.0 E.U./dL  0.2 E.U./dL   RBC, UA Latest Ref Range: None Seen, 0-2 /HPF 0-2   WBC, UA Latest Ref Range: None Seen, 0-2, 3-6 /HPF Too Numerous to C... (A)   Bacteria, UA Latest Ref Range: None Seen /HPF 4+ (A)   Squamous Epithelial Cells, UA Latest Ref Range: None Seen, 0-2 /HPF 0-2   Hyaline Casts, UA Latest Ref Range: None Seen /LPF None Seen   Methodology: Unknown Automated Microscopy   HCG, Urine QL Latest Ref Range: Negative  Negative      Results for CLARA TOMNEY (MRN [id number]) as of 7/8/2018 10:12   Ref. Range 7/8/2018 07:55   Ethanol % Latest Units: % <0.010   Ethanol Latest Ref Range: <=10 mg/dL <10     Pain Management Panel     Pain Management Panel Latest Ref Rng & Units 7/8/2018 3/29/2016    AMPHETAMINES SCREEN, URINE Negative Negative Positive(A)    BARBITURATES SCREEN Negative Negative Negative    BENZODIAZEPINE SCREEN, URINE Negative Negative Negative    BUPRENORPHINE Negative Positive(A) -    COCAINE SCREEN, URINE Negative Negative Negative    METHADONE SCREEN, URINE Negative Negative Negative      I have personally reviewed the above laboratory results.   ---------------------------------------------------------------------------------------------------------------------  Imaging Results (last 7 days)     Procedure Component Value Units Date/Time    XR Chest 1 View [[id number]] Collected:  07/08/18 0932     Updated:  07/08/18 0934    Narrative:       EXAMINATION: XR CHEST 1 VW-      CLINICAL INDICATION:     Simple Sepsis triage protocol     TECHNIQUE:  XR CHEST 1 VW-  "     COMPARISON: NONE      FINDINGS:   The lungs remain aerated.  Heart and mediastinum contours are unremarkable.  No pleural effusion.  No pneumothorax.   Bony and soft tissue structures are unremarkable.       Impression:       No radiographic evidence of acute cardiac or pulmonary  disease.     This report was finalized on 7/8/2018 9:32 AM by Dr. Bahman Joe MD.         I have personally reviewed the above radiology results.   ---------------------------------------------------------------------------------------------------------------------  Assessment & Plan      -Septic shock, as noted by lactic acid level of 2.6, temperature of 103F, heart rate of 101, and persistent hypotension with SBP <90, despite 30mL/kg fluid bolus. She received an additional 1L fluid bolus in the ED. Blood cultures were drawn x2 in the ED. UA reveals evidence of urinary tract infection and urine culture has been ordered as well. Influenza A and B screen are negative as well as monospot. CXR unremarkable. Continue vancomycin and zosyn with pharmacy to dose. Follow up on reflex lactic acid level. Repeat CBC, CMP, and CRP in AM. Continue IV fluids at 150mL/hr. Continue supplemental O2 via nasal cannula to maintain oxygen saturations >90%. Will consider vasopressors if she continues to have persistent hypotension or becomes symptomatic.     -Hypotension: Secondary to sepsis. Asymptomatic. Continue IV fluids. Will consider vasopressors if she continues to have persistent hypotension or becomes symptomatic.     -Acute urinary tract infection: Continue vancomycin and zosyn. Follow up on urine culture and adjust therapy as needed.     -IV drug abuse: Urine drug screen positive for buprenorphine which she is not prescribed. HIV-1/HIV-2 negative.     -Hepatitis C: Avoid hepatoxic medications. Would recommend outpatient evaluation with hepatology for possible treatment.     -Tobacco abuse: 1/3ppd smoking history for \"a long time.\" Encouraged " smoking cessation. Will provide nicotine patch if needed.     DVT Prophylaxis: Subcutaneous heparin.     The patient is considered to be a high risk patient due to: septic shock, hypotension,     Code Status: FULL CODE. Discussed with the patient in the presence of her mother at bedside.     I have discussed the patient's assessment and plan with the patient, her mother, and ASHU Osborn.      PAPITO Alan  07/08/18  10:22 AM  ---------------------------------------------------------------------------------------------------------------------     I have reviewed the notes, assessments, and/or procedures performed by Tanya Jerez PA-C. I concur with her/his documentation of Emely Tye.    Additional fluid boluses ordered as pt remained hypotensive. Vasopressors ordered and surgery consulted for central line placement. Central line has been placed. However, hypotension improved this evening and vasopressors had not been initiated during my encounter. Will order ibuprofen for pain and will order Nicotine patch per pt's request. Pt offered information regarding substance abuse programs but she declined. Cont maintenance IV fluids and broad spectrum IV antibiotics. Monitor in the CCU overnight.     Rylan Cervantes D.O.

## 2018-07-08 NOTE — ED PROVIDER NOTES
Subjective   Patient is a 37-year-old white female who presents complaining onset of severe myalgias of her lower extremities yesterday, followed by the onset of fever today.  She reports that she had nausea and vomiting yesterday morning.  She was on to tell me that a few days ago she had an episode of much milder myalgias, that went away and she did well until yesterday.  She also goes on to tell me that several days ago she went to her family doctor, was diagnosed with urinary tract infection, did not take her antibiotics appropriately.  Patient admits to IV drug abuse, uses intravenous Suboxone.  She denies headaches, neck pain, back pain, chest pain, shortness breath, abdominal pain, diarrhea, hematuria, dysuria, frequency, flank pain, syncope or near syncope, focal numbness weakness, other symptoms or other complaints.  She denies any possibility of pregnancy.  She does report a history of hepatitis C.            Review of Systems   Constitutional: Positive for chills and fever. Negative for diaphoresis.   HENT: Negative for ear pain, sore throat and trouble swallowing.    Eyes: Negative for photophobia and pain.   Respiratory: Negative for shortness of breath, wheezing and stridor.    Cardiovascular: Negative for chest pain and palpitations.   Gastrointestinal: Positive for nausea and vomiting. Negative for abdominal distention, abdominal pain, blood in stool and diarrhea.   Endocrine: Negative for polydipsia and polyphagia.   Genitourinary: Negative for difficulty urinating and flank pain.   Musculoskeletal: Positive for myalgias. Negative for back pain, neck pain and neck stiffness.   Skin: Negative for color change and pallor.   Neurological: Negative for seizures, syncope and speech difficulty.   Psychiatric/Behavioral: Negative for confusion.   All other systems reviewed and are negative.      Past Medical History:   Diagnosis Date   • Hepatitis C virus        No Known Allergies    History reviewed. No  pertinent surgical history.    History reviewed. No pertinent family history.    Social History     Social History   • Marital status: Single     Social History Main Topics   • Smoking status: Current Every Day Smoker     Packs/day: 0.25     Years: 10.00     Types: Cigarettes   • Smokeless tobacco: Never Used   • Drug use: Yes     Types: IV     Other Topics Concern   • Not on file           Objective   Physical Exam   Constitutional: She is oriented to person, place, and time. She appears well-developed and well-nourished.   Patient appears ill but nontoxic   HENT:   Head: Normocephalic and atraumatic.   Eyes: EOM are normal. Pupils are equal, round, and reactive to light. No scleral icterus.   Neck: Normal range of motion. Neck supple. No neck rigidity. No tracheal deviation present.   Cardiovascular: Regular rhythm and intact distal pulses.    Pulmonary/Chest: Effort normal and breath sounds normal. No respiratory distress. She exhibits no tenderness.   Abdominal: Soft. Bowel sounds are normal. There is no tenderness. There is no rebound and no guarding.   Musculoskeletal: Normal range of motion. She exhibits no tenderness.   Signs of IV drug use in the left antecubital fossa   Neurological: She is alert and oriented to person, place, and time. She has normal strength. No sensory deficit. She exhibits normal muscle tone. Coordination normal. GCS eye subscore is 4. GCS verbal subscore is 5. GCS motor subscore is 6.   Skin: Skin is warm and dry. Capillary refill takes less than 2 seconds. She is not diaphoretic. No cyanosis. No pallor.   Psychiatric: She has a normal mood and affect. Her behavior is normal.   Nursing note and vitals reviewed.      Procedures   EKG shows sinus rhythm with a rate of 88.  2 unifocal PVCs.  Incomplete right bundle-branch block.  No apparent acute ischemia.  XR Chest 1 View   Final Result   No radiographic evidence of acute cardiac or pulmonary   disease.       This report was finalized  on 7/8/2018 9:32 AM by Dr. Bahman Joe MD.            Results for orders placed or performed during the hospital encounter of 07/08/18   Influenza Antigen, Rapid - Swab, Nasopharynx   Result Value Ref Range    Influenza A Ag, EIA Negative Negative    Influenza B Ag, EIA Negative Negative   Rapid Strep A Screen - Swab, Throat   Result Value Ref Range    Strep A Ag Negative Negative   Comprehensive Metabolic Panel   Result Value Ref Range    Glucose 110 70 - 110 mg/dL    BUN 16 7 - 21 mg/dL    Creatinine 0.76 0.43 - 1.29 mg/dL    Sodium 138 135 - 153 mmol/L    Potassium 3.8 3.5 - 5.3 mmol/L    Chloride 108 99 - 112 mmol/L    CO2 24.0 (L) 24.3 - 31.9 mmol/L    Calcium 8.5 7.7 - 10.0 mg/dL    Total Protein 6.2 6.0 - 8.0 g/dL    Albumin 3.40 (L) 3.50 - 5.00 g/dL    ALT (SGPT) 113 (H) 10 - 36 U/L    AST (SGOT) 94 (H) 10 - 30 U/L    Alkaline Phosphatase 248 (H) 35 - 104 U/L    Total Bilirubin 0.9 0.2 - 1.8 mg/dL    eGFR Non African Amer 87 >60 mL/min/1.73    Globulin 2.8 gm/dL    A/G Ratio 1.2 (L) 1.5 - 2.5 g/dL    BUN/Creatinine Ratio 21.1 7.0 - 25.0    Anion Gap 6.0 3.6 - 11.2 mmol/L   Lactic Acid, Plasma   Result Value Ref Range    Lactate 2.6 (C) 0.5 - 2.0 mmol/L   CBC Auto Differential   Result Value Ref Range    WBC 10.42 4.50 - 12.50 10*3/mm3    RBC 3.89 (L) 4.20 - 5.40 10*6/mm3    Hemoglobin 12.4 12.0 - 16.0 g/dL    Hematocrit 36.5 (L) 37.0 - 47.0 %    MCV 93.8 80.0 - 94.0 fL    MCH 31.9 27.0 - 33.0 pg    MCHC 34.0 33.0 - 37.0 g/dL    RDW 13.9 11.5 - 14.5 %    RDW-SD 47.3 37.0 - 54.0 fl    MPV 11.0 (H) 6.0 - 10.0 fL    Platelets 123 (L) 130 - 400 10*3/mm3   Urinalysis With Culture If Indicated - Urine, Catheter   Result Value Ref Range    Color, UA Dark Yellow (A) Yellow, Straw    Appearance, UA Cloudy (A) Clear    pH, UA 5.5 5.0 - 8.0    Specific Gravity, UA 1.013 1.005 - 1.030    Glucose, UA Negative Negative    Ketones, UA Negative Negative    Bilirubin, UA Negative Negative    Blood, UA Trace (A) Negative     Protein, UA Trace (A) Negative    Leuk Esterase, UA Small (1+) (A) Negative    Nitrite, UA Positive (A) Negative    Urobilinogen, UA 0.2 E.U./dL 0.2 - 1.0 E.U./dL   Pregnancy, Urine - Urine, Clean Catch   Result Value Ref Range    HCG, Urine QL Negative Negative   Mononucleosis Screen   Result Value Ref Range    Monospot Negative Negative   C-reactive Protein   Result Value Ref Range    C-Reactive Protein 10.73 (H) 0.00 - 0.99 mg/dL   TSH   Result Value Ref Range    TSH 0.913 0.550 - 4.780 mIU/mL   T4, Free   Result Value Ref Range    Free T4 1.04 0.89 - 1.76 ng/dL   Magnesium   Result Value Ref Range    Magnesium 1.7 1.7 - 2.6 mg/dL   Ethanol   Result Value Ref Range    Ethanol <10 <=10 mg/dL    Ethanol % <0.010 %   Urine Drug Screen - Urine, Clean Catch   Result Value Ref Range    Amphetamine Screen, Urine Negative Negative    Barbiturates Screen, Urine Negative Negative    Benzodiazepine Screen, Urine Negative Negative    Cocaine Screen, Urine Negative Negative    Methadone Screen, Urine Negative Negative    Opiate Screen Negative Negative    Phencyclidine (PCP), Urine Negative Negative    THC, Screen, Urine Negative Negative    6-ACETYL MORPHINE Negative Negative    Buprenorphine, Screen, Urine Positive (A) Negative    Oxycodone Screen, Urine Negative Negative   Troponin   Result Value Ref Range    Troponin I <0.006 <=0.040 ng/mL   Hepatitis Panel, Acute   Result Value Ref Range    Hepatitis B Surface Ag Non-Reactive Non-Reactive    Hep A IgM Equivocal (A) Non-Reactive    Hep B C IgM Non-Reactive Non-Reactive    Hepatitis C Ab Reactive (A) Non-Reactive   HIV-1 / O / 2 Ag / Antibody 4th Generation   Result Value Ref Range    HIV-1/ HIV-2 Non-Reactive Non-Reactive   Scan Slide   Result Value Ref Range    Scan Slide     Urinalysis, Microscopic Only - Urine, Clean Catch   Result Value Ref Range    RBC, UA 0-2 None Seen, 0-2 /HPF    WBC, UA Too Numerous to Count (A) None Seen, 0-2, 3-6 /HPF    Bacteria, UA 4+ (A)  None Seen /HPF    Squamous Epithelial Cells, UA 0-2 None Seen, 0-2 /HPF    Hyaline Casts, UA None Seen None Seen /LPF    Methodology Automated Microscopy    Osmolality, Calculated   Result Value Ref Range    Osmolality Calc 277.5 273.0 - 305.0 mOsm/kg   Lactic Acid, Reflex Timer (This will reflex a repeat order 3-3:15 hours after ordered.)   Result Value Ref Range    Extra Tube Hold for add-ons.    Lactic Acid, Reflex   Result Value Ref Range    Lactate 1.2 0.5 - 2.0 mmol/L   Light Blue Top   Result Value Ref Range    Extra Tube hold for add-on    Green Top (Gel)   Result Value Ref Range    Extra Tube Hold for add-ons.    Lavender Top   Result Value Ref Range    Extra Tube hold for add-on    Gold Top - SST   Result Value Ref Range    Extra Tube Hold for add-ons.    Manual Differential   Result Value Ref Range    Neutrophil % 67.0 30.0 - 70.0 %    Lymphocyte % 6.0 (L) 21.0 - 51.0 %    Bands %  27.0 (H) 4.0 - 12.0 %    Neutrophils Absolute 9.79 (H) 1.40 - 6.50 10*3/mm3    Lymphocytes Absolute 0.63 (L) 1.00 - 3.00 10*3/mm3    RBC Morphology Normal Normal    Platelet Morphology Normal Normal                ED Course  Patient was done well throughout her emergency department stay.  She appears to feel much better with her IV fluids and medications.  She has had some hypotension but has not been symptomatic of it.  I discussed the case with Dr. Cervantes, and he is admitted the patient to the CCU under his service for further care.                  MDM  Number of Diagnoses or Management Options  Sepsis secondary to UTI (CMS/HCC):   Critical Care  Total time providing critical care: 30-74 minutes (30)        Final diagnoses:   Sepsis secondary to UTI (CMS/HCC)             Please note that portions of this note were completed with a voice recognition program. Efforts were made to edit the dictations, but occasionally words are mistranscribed.       Guerrero Ibarra MD  07/08/18 0727

## 2018-07-08 NOTE — PROGRESS NOTES
Patient evaluated for vancomycin dosing to treat sepsis. Based on her demographics and estimated renal function, will dose empirically at 750 mg q 8 hrs to target trough of 15-20 mg/L. Pharmacy will follow and obtain trough level when steady state is reached to assess need for dose adjustment. Thank you for consulting.    Tanya Damon [facility]  07/08/18  11:50 AM

## 2018-07-08 NOTE — PLAN OF CARE
Problem: Sepsis/Septic Shock (Adult)  Goal: Signs and Symptoms of Listed Potential Problems Will be Absent, Minimized or Managed (Sepsis/Septic Shock)   07/08/18 1107   Goal/Outcome Evaluation   Problems Assessed (Sepsis) infection progression;situational response   Problems Present (Sepsis) infection progression;situational response      07/08/18 1107   Goal/Outcome Evaluation   Problems Assessed (Sepsis) infection progression;situational response   Problems Present (Sepsis) infection progression;situational response       Problem: Fall Risk (Adult)  Goal: Identify Related Risk Factors and Signs and Symptoms   07/08/18 1107   Fall Risk (Adult)   Related Risk Factors (Fall Risk) slippery/uneven surfaces;environment unfamiliar   Signs and Symptoms (Fall Risk) presence of risk factors     Goal: Absence of Fall   07/08/18 1107   Fall Risk (Adult)   Absence of Fall making progress toward outcome       Problem: Skin Injury Risk (Adult)  Goal: Identify Related Risk Factors and Signs and Symptoms   07/08/18 1107   Skin Injury Risk (Adult)   Related Risk Factors (Skin Injury Risk) critical care admission;infection     Goal: Skin Health and Integrity   07/08/18 1107   Skin Injury Risk (Adult)   Skin Health and Integrity making progress toward outcome       Problem: Pain, Acute (Adult)  Goal: Identify Related Risk Factors and Signs and Symptoms   07/08/18 1107   Pain, Acute (Adult)   Related Risk Factors (Acute Pain) infection;positioning   Signs and Symptoms (Acute Pain) fatigue/weakness     Goal: Acceptable Pain Control/Comfort Level   07/08/18 1107   Pain, Acute (Adult)   Acceptable Pain Control/Comfort Level making progress toward outcome

## 2018-07-09 PROBLEM — R78.81 BACTEREMIA: Status: ACTIVE | Noted: 2018-07-09

## 2018-07-09 PROBLEM — N10 ACUTE PYELONEPHRITIS: Status: ACTIVE | Noted: 2018-07-09

## 2018-07-09 PROBLEM — R65.20 SEVERE SEPSIS: Status: ACTIVE | Noted: 2018-07-08

## 2018-07-09 LAB
ALBUMIN SERPL-MCNC: 2.6 G/DL (ref 3.5–5)
ALBUMIN/GLOB SERPL: 1.1 G/DL (ref 1.5–2.5)
ALP SERPL-CCNC: 141 U/L (ref 35–104)
ALT SERPL W P-5'-P-CCNC: 73 U/L (ref 10–36)
ANION GAP SERPL CALCULATED.3IONS-SCNC: 1.3 MMOL/L (ref 3.6–11.2)
AST SERPL-CCNC: 47 U/L (ref 10–30)
BACTERIA BLD CULT: ABNORMAL
BASOPHILS # BLD AUTO: 0.03 10*3/MM3 (ref 0–0.3)
BASOPHILS NFR BLD AUTO: 0.2 % (ref 0–2)
BILIRUB SERPL-MCNC: 0.6 MG/DL (ref 0.2–1.8)
BUN BLD-MCNC: 15 MG/DL (ref 7–21)
BUN/CREAT SERPL: 19.7 (ref 7–25)
CALCIUM SPEC-SCNC: 7.5 MG/DL (ref 7.7–10)
CHLORIDE SERPL-SCNC: 114 MMOL/L (ref 99–112)
CO2 SERPL-SCNC: 21.7 MMOL/L (ref 24.3–31.9)
CREAT BLD-MCNC: 0.76 MG/DL (ref 0.43–1.29)
CRP SERPL-MCNC: 10.5 MG/DL (ref 0–0.99)
DEPRECATED RDW RBC AUTO: 49.6 FL (ref 37–54)
EOSINOPHIL # BLD AUTO: 0.07 10*3/MM3 (ref 0–0.7)
EOSINOPHIL NFR BLD AUTO: 0.4 % (ref 0–5)
ERYTHROCYTE [DISTWIDTH] IN BLOOD BY AUTOMATED COUNT: 14.5 % (ref 11.5–14.5)
GFR SERPL CREATININE-BSD FRML MDRD: 87 ML/MIN/1.73
GLOBULIN UR ELPH-MCNC: 2.4 GM/DL
GLUCOSE BLD-MCNC: 119 MG/DL (ref 70–110)
HAV IGM SERPL QL IA: ABNORMAL
HCT VFR BLD AUTO: 31.1 % (ref 37–47)
HGB BLD-MCNC: 9.8 G/DL (ref 12–16)
IMM GRANULOCYTES # BLD: 0.04 10*3/MM3 (ref 0–0.03)
IMM GRANULOCYTES NFR BLD: 0.2 % (ref 0–0.5)
LYMPHOCYTES # BLD AUTO: 2.89 10*3/MM3 (ref 1–3)
LYMPHOCYTES NFR BLD AUTO: 16.1 % (ref 21–51)
MAGNESIUM SERPL-MCNC: 2.4 MG/DL (ref 1.7–2.6)
MAGNESIUM SERPL-MCNC: 2.6 MG/DL (ref 1.7–2.6)
MCH RBC QN AUTO: 31.3 PG (ref 27–33)
MCHC RBC AUTO-ENTMCNC: 31.5 G/DL (ref 33–37)
MCV RBC AUTO: 99.4 FL (ref 80–94)
MONOCYTES # BLD AUTO: 0.78 10*3/MM3 (ref 0.1–0.9)
MONOCYTES NFR BLD AUTO: 4.4 % (ref 0–10)
NEUTROPHILS # BLD AUTO: 14.1 10*3/MM3 (ref 1.4–6.5)
NEUTROPHILS NFR BLD AUTO: 78.7 % (ref 30–70)
OSMOLALITY SERPL CALC.SUM OF ELEC: 275.8 MOSM/KG (ref 273–305)
PHOSPHATE SERPL-MCNC: 2.1 MG/DL (ref 2.7–4.5)
PHOSPHATE SERPL-MCNC: 2.3 MG/DL (ref 2.7–4.5)
PLATELET # BLD AUTO: 102 10*3/MM3 (ref 130–400)
PMV BLD AUTO: 11.7 FL (ref 6–10)
POTASSIUM BLD-SCNC: 3.7 MMOL/L (ref 3.5–5.3)
PROT SERPL-MCNC: 5 G/DL (ref 6–8)
RBC # BLD AUTO: 3.13 10*6/MM3 (ref 4.2–5.4)
SODIUM BLD-SCNC: 137 MMOL/L (ref 135–153)
WBC NRBC COR # BLD: 17.91 10*3/MM3 (ref 4.5–12.5)

## 2018-07-09 PROCEDURE — 87040 BLOOD CULTURE FOR BACTERIA: CPT | Performed by: INTERNAL MEDICINE

## 2018-07-09 PROCEDURE — 25010000002 MEROPENEM: Performed by: INTERNAL MEDICINE

## 2018-07-09 PROCEDURE — 25010000002 HEPARIN (PORCINE) PER 1000 UNITS: Performed by: PHYSICIAN ASSISTANT

## 2018-07-09 PROCEDURE — 25010000002 GENTAMICIN PER 80 MG: Performed by: NURSE PRACTITIONER

## 2018-07-09 PROCEDURE — 85025 COMPLETE CBC W/AUTO DIFF WBC: CPT | Performed by: PHYSICIAN ASSISTANT

## 2018-07-09 PROCEDURE — 86709 HEPATITIS A IGM ANTIBODY: CPT | Performed by: INTERNAL MEDICINE

## 2018-07-09 PROCEDURE — 94799 UNLISTED PULMONARY SVC/PX: CPT

## 2018-07-09 PROCEDURE — 25010000002 CEFEPIME: Performed by: NURSE PRACTITIONER

## 2018-07-09 PROCEDURE — 25010000002 VANCOMYCIN PER 500 MG

## 2018-07-09 PROCEDURE — 83735 ASSAY OF MAGNESIUM: CPT | Performed by: INTERNAL MEDICINE

## 2018-07-09 PROCEDURE — 86140 C-REACTIVE PROTEIN: CPT | Performed by: PHYSICIAN ASSISTANT

## 2018-07-09 PROCEDURE — 99233 SBSQ HOSP IP/OBS HIGH 50: CPT | Performed by: INTERNAL MEDICINE

## 2018-07-09 PROCEDURE — 84100 ASSAY OF PHOSPHORUS: CPT | Performed by: INTERNAL MEDICINE

## 2018-07-09 PROCEDURE — 99253 IP/OBS CNSLTJ NEW/EST LOW 45: CPT | Performed by: PSYCHIATRY & NEUROLOGY

## 2018-07-09 PROCEDURE — 25010000002 PIPERACILLIN-TAZOBACTAM

## 2018-07-09 PROCEDURE — 80053 COMPREHEN METABOLIC PANEL: CPT | Performed by: PHYSICIAN ASSISTANT

## 2018-07-09 RX ORDER — L.ACID,PARA/B.BIFIDUM/S.THERM 8B CELL
1 CAPSULE ORAL DAILY
Status: DISCONTINUED | OUTPATIENT
Start: 2018-07-09 | End: 2018-07-10 | Stop reason: HOSPADM

## 2018-07-09 RX ADMIN — POTASSIUM PHOSPHATE, MONOBASIC AND POTASSIUM PHOSPHATE, DIBASIC 15 MMOL: 224; 236 INJECTION, SOLUTION INTRAVENOUS at 03:55

## 2018-07-09 RX ADMIN — TAZOBACTAM SODIUM AND PIPERACILLIN SODIUM 4.5 G: .5; 4 INJECTION, POWDER, LYOPHILIZED, FOR SOLUTION INTRAVENOUS at 02:40

## 2018-07-09 RX ADMIN — VANCOMYCIN HYDROCHLORIDE 750 MG: 5 INJECTION, POWDER, LYOPHILIZED, FOR SOLUTION INTRAVENOUS at 02:40

## 2018-07-09 RX ADMIN — SODIUM CHLORIDE 100 ML/HR: 9 INJECTION, SOLUTION INTRAVENOUS at 13:11

## 2018-07-09 RX ADMIN — CEFEPIME 2 G: 2 INJECTION, POWDER, FOR SOLUTION INTRAVENOUS at 11:58

## 2018-07-09 RX ADMIN — HYDROXYZINE HYDROCHLORIDE 50 MG: 50 TABLET ORAL at 11:57

## 2018-07-09 RX ADMIN — MEROPENEM 1 G: 1 INJECTION, POWDER, FOR SOLUTION INTRAVENOUS at 05:55

## 2018-07-09 RX ADMIN — IBUPROFEN 400 MG: 400 TABLET, FILM COATED ORAL at 22:08

## 2018-07-09 RX ADMIN — IBUPROFEN 400 MG: 400 TABLET, FILM COATED ORAL at 06:17

## 2018-07-09 RX ADMIN — NICOTINE 1 PATCH: 21 PATCH TRANSDERMAL at 08:40

## 2018-07-09 RX ADMIN — GENTAMICIN SULFATE 160 MG: 40 INJECTION, SOLUTION INTRAMUSCULAR; INTRAVENOUS at 13:11

## 2018-07-09 RX ADMIN — HEPARIN SODIUM 5000 UNITS: 5000 INJECTION, SOLUTION INTRAVENOUS; SUBCUTANEOUS at 08:39

## 2018-07-09 RX ADMIN — Medication 1 CAPSULE: at 11:57

## 2018-07-09 RX ADMIN — HEPARIN SODIUM 5000 UNITS: 5000 INJECTION, SOLUTION INTRAVENOUS; SUBCUTANEOUS at 22:07

## 2018-07-09 NOTE — DISCHARGE PLACEMENT REQUEST
"Funmi Gamble  (37 y.o. Female)     Date of Birth Social Security Number Address Home Phone MRN    1980  [address] [address] [phone] [id number]    Evangelical Marital Status          None Single       Admission Date Admission Type Admitting Provider Attending Provider Department, Room/Bed    7/8/18 Emergency Rylan Cervantes DO [place], Christopher A, DO [facility] CRITICAL CARE, CC06/1C    Discharge Date Discharge Disposition Discharge Destination                       Attending Provider:  Rylan Cervantes DO    Allergies:  No Known Allergies    Isolation:  None   Infection:  None   Code Status:  CPR    Ht:  162.6 cm (64\")   Wt:  67.2 kg (148 lb 1.6 oz)    Admission Cmt:  None   Principal Problem:  Sepsis (CMS/[facility]) [A41.9]                 Active Insurance as of 7/8/2018     Primary Coverage     Payor Plan Insurance Group Employer/Plan Group    [place]      Payor Plan Address Payor Plan Phone Number Effective From Effective To    [facility]  7/8/2018     Subscriber Name Subscriber Birth Date Member ID       FUNMI GAMBLE 1980 PENDING                 Emergency Contacts      (Rel.) Home Phone Work Phone Mobile Phone    Maricruz Osorio (Mother) -- -- [phone]    Katty Pate (Sister) -- -- [phone]    [place],Randall (Step Parent) -- -- [phone]            Emergency Contact Information     Name Relation Home Work Mobile    Maricruz Osorio Mother   [phone]    Katty Pate Sister   [phone]    RiverRandall Step Parent   [phone]          Insurance Information                [place] Phone:     Subscriber: Tye, Funmi Subscriber#: PENDING    Group#:  Precert#:           "

## 2018-07-09 NOTE — PROGRESS NOTES
Discharge Planning Assessment   Abhilash     Patient Name: Emely Gamble  MRN: [id number]  Today's Date: 7/9/2018    Admit Date: 7/8/2018          Discharge Needs Assessment     Row Name 07/09/18 1344       Living Environment    Lives With parent(s)    Name(s) of Who Lives With Patient Mother, Maricruz Tripp     Current Living Arrangements home/apartment/condo    Primary Care Provided by self    Provides Primary Care For no one    Family Caregiver if Needed parent(s)    Quality of Family Relationships helpful;involved;supportive    Able to Return to Prior Arrangements yes       Resource/Environmental Concerns    Transportation Concerns car, none       Transition Planning    Patient/Family Anticipates Transition to home with family    Patient/Family Anticipated Services at Transition none    Transportation Anticipated family or friend will provide       Discharge Needs Assessment    Readmission Within the Last 30 Days no previous admission in last 30 days    Concerns to be Addressed substance/tobacco abuse/use    Concerns Comments Pt admits to using suboxone that she is prescribed by IV.  SS discussed possible substance abuse rehab and the pt reports she has been in rehab in the past and does not feel rehab will work until she is ready to quit.  Pt is agreeable for SS to leave resources for treatment once she is ready.  SS left the pt with resources available.      Equipment Currently Used at Home none    Anticipated Changes Related to Illness none    Equipment Needed After Discharge none    Outpatient/Agency/Support Group Needs --   Pt does not use home health.     Current Discharge Risk substance use/abuse    Discharge Coordination/Progress Pt has one child age 12 y/o who is in the custody of her father, Josiah.  Pt reports when she was placed in halfway, he obtained custody.  Pt reports she has not had contact with her son in a long time and plans to begin counseling with her son soon.              Discharge Plan      Row Name 07/09/18 1783       Plan    Plan Pt lives at home with her mother, Maricruz and plans to return home at discharge. Pt does not have home health or DME. Pt does not have a POA or LW.  Pt is mobile and I with ADL's.  Pt fills her prescriptions at [facility].  Pt is mobile and I with ADL's.  Pt denies any need for substance abuse treatment at this time but was agreeable for SS to provide resources available.  Pt's PCP is [place] Gomez.  Pt will be transported home via private auto.  Pt also asked for SS to fax a letter to [facility] due to her missing court on this date.  SS contacted [facility] [address] and spoke with Cachorro and he states the pt missed her 9:00 AM court hearing.  Cachorro requested a statement in order to provide it to the .  SS faxed a statement to [phone]. SS will continue to follow.     Patient/Family in Agreement with Plan yes             Expected Discharge Date and Time     Expected Discharge Date Expected Discharge Time    Jul 13, 2018               Demographic Summary     Row Name 07/09/18 1343       General Information    Admission Type inpatient    Referral Source physician    Reason for Consult other (see comments)   CCU Admission    Preferred Language English     Used During This Interaction no            Functional Status     Row Name 07/09/18 1344       Functional Status    Usual Activity Tolerance excellent    Current Activity Tolerance moderate            Jania Johnson

## 2018-07-09 NOTE — PROGRESS NOTES
Brief Hospitalist Note:    Was contacted by nurse Fox that the patient had 1/2 blood cultures revealing Enterobacter cloacae. An infectious disease consult has been placed per protocol. In the meantime, I have discontinued Zosyn and gave a one time dose of Merrem.

## 2018-07-09 NOTE — PLAN OF CARE
Problem: Patient Care Overview  Goal: Plan of Care Review  Outcome: Ongoing (interventions implemented as appropriate)    Goal: Individualization and Mutuality  Outcome: Ongoing (interventions implemented as appropriate)    Goal: Discharge Needs Assessment  Outcome: Ongoing (interventions implemented as appropriate)      Problem: Sepsis/Septic Shock (Adult)  Goal: Signs and Symptoms of Listed Potential Problems Will be Absent, Minimized or Managed (Sepsis/Septic Shock)  Outcome: Ongoing (interventions implemented as appropriate)      Problem: Fall Risk (Adult)  Goal: Identify Related Risk Factors and Signs and Symptoms  Outcome: Ongoing (interventions implemented as appropriate)    Goal: Absence of Fall  Outcome: Ongoing (interventions implemented as appropriate)      Problem: Skin Injury Risk (Adult)  Goal: Identify Related Risk Factors and Signs and Symptoms  Outcome: Ongoing (interventions implemented as appropriate)    Goal: Skin Health and Integrity  Outcome: Ongoing (interventions implemented as appropriate)      Problem: Pain, Acute (Adult)  Goal: Identify Related Risk Factors and Signs and Symptoms  Outcome: Ongoing (interventions implemented as appropriate)    Goal: Acceptable Pain Control/Comfort Level  Outcome: Ongoing (interventions implemented as appropriate)

## 2018-07-09 NOTE — CONSULTS
INFECTIOUS DISEASE CONSULTATION REPORT      Referring Provider: Dr. Cervantes  Reason for Consultation: Gram negative Bacilli BCID      Principal problem:     Subjective .     History of present illness:    As you well know Dr. Cervantes, Ms. Emely Gamble is a 37 y.o. years old female with past medical history significant for hepatitis C and IV drug abuse, who presented to [facility] Emergency Department on 7/8/2018 for bilateral lower extremity pain and fever.  Upon admission patient was found to have a fever of 103.  Patient is currently hypotensive requiring Levophed for blood pressure support.  Lactic acid 1.2 improving from 2.6 on admission.  WBC 17.91.  Improving CRP of 10.5.  Blood cultures from 7/8/18 1 of 2 sets preliminary shows growth of Enterobacter.  Urine culture preliminary shows greater than 100,000 colonies of gram-negative bacilli consistent with Escherichia coli/Citrobacter.  Influenza screen negative.  Streptococcal screen negative.    Infectious Disease consultation was requested for antimicrobial management.     History taken from: patient chart RN    Case was discussed with patient, nursing staff, primary care team and consulting provider    Review of Systems     Constitutional: positive fever, positive chills and positive night sweats. No appetite change or unexpected weight change. No fatigue.  Eyes: no eye drainage, itching or redness.  HEENT: no mouth sores, dysphagia or nose bleed.  Respiratory: no for shortness of breath, cough or production of sputum.  Cardiovascular: no chest pain, no palpitations, no orthopnea.  Gastrointestinal: no nausea, vomiting or diarrhea. No abdominal pain, hematemesis or rectal bleeding.  Genitourinary: no dysuria or polyuria.  Hematologic/lymphatic: no lymph node abnormalities, no easy bruising or easy bleeding.  Musculoskeletal: Generalized body aches  Skin: No rash and no itching.  Neurological: no loss of consciousness, no seizure, no  "headache.  Behavioral/Psych: no depression or suicidal ideation.  Endocrine: no hot flashes.  Immunologic: negative.    Past Medical History    Past Medical History:   Diagnosis Date   • Hepatitis C virus        Past Surgical History    History reviewed. No pertinent surgical history.    Family History    History reviewed. No pertinent family history.    Social History    Social History   Substance Use Topics   • Smoking status: Current Every Day Smoker     Packs/day: 0.25     Years: 10.00     Types: Cigarettes   • Smokeless tobacco: Never Used   • Alcohol use Not on file       Allergies    Patient has no known allergies.    Objective     /63 (BP Location: Left arm, Patient Position: Lying)   Pulse 61   Temp 98.3 °F (36.8 °C) (Oral)   Resp 14   Ht 162.6 cm (64\")   Wt 67.2 kg (148 lb 1.6 oz)   SpO2 100%   BMI 25.42 kg/m²     Temp:  [97.5 °F (36.4 °C)-98.3 °F (36.8 °C)] 98.3 °F (36.8 °C)        Intake/Output Summary (Last 24 hours) at 07/09/18 1016  Last data filed at 07/09/18 0555   Gross per 24 hour   Intake          6357.91 ml   Output             1100 ml   Net          5257.91 ml         Physical Exam:      General Appearance:    Alert, cooperative, in no acute distress   Head:    Normocephalic, without obvious abnormality, atraumatic   Eyes:            Lids and lashes normal, conjunctivae and sclerae normal, no   icterus, no pallor, corneas clear, PERRLA   Ears:    Ears appear intact with no abnormalities noted   Throat:   No oral lesions, no thrush, oral mucosa moist   Neck:   No adenopathy, supple, trachea midline, no thyromegaly, no   carotid bruit, no JVD   Back:     No tenderness to percussion or palpation, range of motion   normal   Lungs:     Clear to auscultation,respirations regular, even and unlabored. No wheezing, no ronchi and no crackles.    Heart:    Regular rhythm and normal rate, normal S1 and S2, no            murmur, no gallop, no rub, no click   Chest Wall:    No abnormalities " observed   Abdomen:     Normal bowel sounds, no masses, no organomegaly, soft        non-tender, non-distended, no guarding, no rebound                tenderness   Rectal:     Deferred   Extremities:   Moves all extremities well, no edema, no cyanosis, no             redness   Pulses:   Pulses palpable and equal bilaterally   Skin:   No bleeding, bruising or rash   Lymph nodes:   No palpable adenopathy   Neurologic:   Awake, alert and oriented x 3. Following commands.       Results:      Results from last 7 days  Lab Units 07/09/18  0059 07/08/18  0755   WBC 10*3/mm3 17.91* 10.42     Lab Results   Component Value Date    NEUTROABS 14.10 (H) 07/09/2018         Results from last 7 days  Lab Units 07/09/18  0059   CREATININE mg/dL 0.76         Results from last 7 days  Lab Units 07/09/18  0059 07/08/18  0755   CRP mg/dL 10.50* 10.73*       Imaging Results (last 24 hours)     Procedure Component Value Units Date/Time    XR Chest 1 View [[id number]] Collected:  07/08/18 1953     Updated:  07/08/18 1956    Narrative:       XR CHEST 1 VW-     CLINICAL INDICATION: central line placement; A41.9-Sepsis, unspecified  organism; N39.0-Urinary tract infection, site not specified          COMPARISON: 07/08/2018      TECHNIQUE: Single frontal view of the chest.     FINDINGS:     Right-sided central line is been placed from a subclavian approach. The  tip is in the superior vena cava. There is no pneumothorax  The cardiac silhouette is normal. The pulmonary vasculature is  unremarkable.  There is no evidence of an acute osseous abnormality.   There are no suspicious-appearing parenchymal soft tissue nodules.            Impression:       Central line tip in the superior vena cava         This report was finalized on 7/8/2018 7:54 PM by Dr. Jose Manuel Lozada MD.               Cultures:    Blood Culture   Date Value Ref Range Status   07/08/2018 No growth at less than 24 hours  Preliminary   07/08/2018 Abnormal Stain (A)  Preliminary  "    Urine Culture   Date Value Ref Range Status   07/08/2018 (A)  Preliminary    >100,000 CFU/mL Gram Negative Bacilli, Morphology consistent with Escherichia / Citrobacter       Results Review:    I have personally reviewed laboratory data, culture results, radiology studies and antimicrobial therapy.    Hospital Medications (active)       Dose Frequency Start End    acetaminophen (TYLENOL) tablet 650 mg 650 mg Every 6 Hours PRN 7/8/2018     Sig - Route: Take 2 tablets by mouth Every 6 (Six) Hours As Needed for Mild Pain . - Oral    heparin (porcine) 5000 UNIT/ML injection 5,000 Units 5,000 Units Every 12 Hours Scheduled 7/8/2018     Sig - Route: Inject 1 mL under the skin Every 12 (Twelve) Hours. - Subcutaneous    Cosign for Ordering: Accepted by Rylan Cervantes DO on 7/8/2018  8:43 PM    hydrOXYzine (ATARAX) tablet 50 mg 50 mg Every 6 Hours PRN 7/8/2018     Sig - Route: Take 1 tablet by mouth Every 6 (Six) Hours As Needed for Anxiety or Sleep. - Oral    ibuprofen (ADVIL,MOTRIN) tablet 400 mg 400 mg Every 6 Hours PRN 7/8/2018     Sig - Route: Take 1 tablet by mouth Every 6 (Six) Hours As Needed for Mild Pain . - Oral    lactobacillus acidophilus (RISAQUAD) capsule 1 capsule 1 capsule Daily 7/9/2018     Sig - Route: Take 1 capsule by mouth Daily. - Oral    Magnesium Sulfate 2 gram / 50mL Infusion (GIVE X 3 BAGS TO EQUAL 6GM TOTAL DOSE) - Mg 1.1 - 1/5 mg/dl 2 g As Needed 7/8/2018     Sig - Route: Infuse 50 mL into a venous catheter As Needed (See Administration Instructions). - Intravenous    Linked Group 1:  \"Or\" Linked Group Details        Magnesium Sulfate 2 gram Bolus, followed by 8 gram infusion (total Mg dose 10 grams)- Mg less than or equal to 1mg/dL 2 g As Needed 7/8/2018     Sig - Route: Infuse 50 mL into a venous catheter As Needed (See Administration Instructions). - Intravenous    Linked Group 1:  \"Or\" Linked Group Details        Magnesium Sulfate 4 gram infusion- Mg 1.6-1.9 mg/dL 4 g As Needed " "7/8/2018     Sig - Route: Infuse 100 mL into a venous catheter As Needed (See Administration Instructions). - Intravenous    Linked Group 1:  \"Or\" Linked Group Details        Magnesium Sulfate 4 gram infusion- Mg 1.6-1.9 mg/dL 4 g Once 7/8/2018 7/9/2018    Sig - Route: Infuse 100 mL into a venous catheter 1 (One) Time. - Intravenous    meropenem (MERREM) 1 g/100 mL 0.9% NS VTB (mbp) 1 g Once 7/9/2018 7/9/2018    Sig - Route: Infuse 100 mL into a venous catheter 1 (One) Time. - Intravenous    nicotine (NICODERM CQ) 21 MG/24HR patch 1 patch 1 patch Daily 7/8/2018     Sig - Route: Place 1 patch on the skin Daily. - Transdermal    norepinephrine (LEVOPHED) 8,000 mcg in sodium chloride 0.9 % 250 mL (32 mcg/mL) infusion 0.02-0.3 mcg/kg/min × 67.2 kg Titrated 7/8/2018     Sig - Route: Infuse 1.344-20.16 mcg/min into a venous catheter Dose Adjusted By Provider As Needed. - Intravenous    Pharmacy Consult - Pharmacy to dose  Continuous PRN 7/8/2018     Sig - Route: Continuous As Needed for Consult. - Does not apply    Cosign for Ordering: Accepted by Rylan Cervantes DO on 7/8/2018  8:43 PM    Pharmacy Consult - Pharmacy to dose  Continuous PRN 7/8/2018     Sig - Route: Continuous As Needed for Consult. - Does not apply    Cosign for Ordering: Accepted by Rylan Cervantes DO on 7/8/2018  8:43 PM    pneumococcal polysaccharide 23-valent (PNEUMOVAX-23) vaccine 0.5 mL 0.5 mL During Hospitalization 7/8/2018     Sig - Route: Inject 0.5 mL into the shoulder, thigh, or buttocks During Hospitalization for Immunization. - Intramuscular    Cosign for Ordering: Accepted by Rylan Cervantes DO on 7/8/2018  8:44 PM    potassium chloride (K-DUR,KLOR-CON) CR tablet 40 mEq 40 mEq As Needed 7/8/2018     Sig - Route: Take 2 tablets by mouth As Needed (potassium replacement.  see admin instructions). - Oral    Linked Group 2:  \"Or\" Linked Group Details        potassium chloride (KLOR-CON) packet 40 mEq 40 mEq As Needed " "7/8/2018     Sig - Route: Take 40 mEq by mouth As Needed (potassium replacement, see admin instructions). - Oral    Linked Group 2:  \"Or\" Linked Group Details        potassium chloride 10 mEq in 100 mL IVPB 10 mEq Every 1 Hour PRN 7/8/2018     Sig - Route: Infuse 100 mL into a venous catheter Every 1 (One) Hour As Needed (potassium protocol PERIPHERAL - see admin instructions). - Intravenous    Linked Group 2:  \"Or\" Linked Group Details        potassium phosphate 15 mmol in sodium chloride 0.9 % 100 mL infusion 15 mmol As Needed 7/8/2018     Sig - Route: Infuse 15 mmol into a venous catheter As Needed (Peripheral IV - Phosphorus 1.8 - 2.5). - Intravenous    Linked Group 3:  \"Or\" Linked Group Details        potassium phosphate 15 mmol in sodium chloride 0.9 % 100 mL infusion 15 mmol Once 7/9/2018 7/9/2018    Sig - Route: Infuse 15 mmol into a venous catheter 1 (One) Time. - Intravenous    potassium phosphate 30 mmol in sodium chloride 0.9 % 250 mL infusion 30 mmol As Needed 7/8/2018     Sig - Route: Infuse 30 mmol into a venous catheter As Needed (Peripheral IV - Phosphorus 1.3 - 1.7). - Intravenous    Linked Group 3:  \"Or\" Linked Group Details        potassium phosphate 45 mmol in sodium chloride 0.9 % 500 mL infusion 45 mmol As Needed 7/8/2018     Sig - Route: Infuse 45 mmol into a venous catheter As Needed (Peripheral IV - Phosphorus Less Than 1.3). - Intravenous    Linked Group 3:  \"Or\" Linked Group Details        sodium chloride 0.9 % bolus 1,000 mL 1,000 mL Once 7/8/2018     Sig - Route: Infuse 1,000 mL into a venous catheter 1 (One) Time. - Intravenous    sodium chloride 0.9 % bolus 1,000 mL 1,000 mL Once 7/8/2018 7/8/2018    Sig - Route: Infuse 1,000 mL into a venous catheter 1 (One) Time. - Intravenous    sodium chloride 0.9 % flush 1-10 mL 1-10 mL As Needed 7/8/2018     Sig - Route: Infuse 1-10 mL into a venous catheter As Needed for Line Care. - Intravenous    Cosign for Ordering: Accepted by Rylan TORRES" DO Helen on 7/8/2018  8:43 PM    sodium chloride 0.9 % flush 10 mL 10 mL As Needed 7/8/2018     Sig - Route: Infuse 10 mL into a venous catheter As Needed for Line Care. - Intravenous    Cosign for Ordering: Accepted by Guerrero Ibarra MD on 7/8/2018  7:59 AM    sodium chloride 0.9 % flush 10 mL 10 mL As Needed 7/8/2018     Sig - Route: Infuse 10 mL into a venous catheter As Needed for Line Care (Every 12 hours when not in use, After Medication Administration). - Intravenous    sodium chloride 0.9 % flush 10 mL 10 mL As Needed 7/8/2018     Sig - Route: Infuse 10 mL into a venous catheter As Needed for Line Care (Every 12 hours when not in use, After Medication Administration). - Intravenous    sodium chloride 0.9 % flush 10 mL 10 mL As Needed 7/8/2018     Sig - Route: 10 mL by Intracatheter route As Needed for Line Care (Every 12 hours when not in use, After Medication Administration). - Intracatheter    sodium chloride 0.9 % flush 10 mL 10 mL As Needed 7/8/2018     Sig - Route: Infuse 10 mL into a venous catheter As Needed for Line Care (Every 12 hours when not in use, After Medication Administration). - Intravenous    sodium chloride 0.9 % flush 10 mL 10 mL As Needed 7/8/2018     Sig - Route: Infuse 10 mL into a venous catheter As Needed for Line Care (Every 12 hours when not in use, After Medication Administration). - Intravenous    sodium chloride 0.9 % flush 20 mL 20 mL As Needed 7/8/2018     Sig - Route: Infuse 20 mL into a venous catheter As Needed for Line Care (After Blood Draws or Blood Product Administration). - Intravenous    sodium chloride 0.9 % infusion 100 mL/hr Continuous 7/8/2018     Sig - Route: Infuse 100 mL/hr into a venous catheter Continuous. - Intravenous    sodium phosphates 15 mmol in sodium chloride 0.9 % 250 mL IVPB 15 mmol As Needed 7/8/2018     Sig - Route: Infuse 15 mmol into a venous catheter As Needed (Peripheral IV - Phosphorus 1.8 - 2.5 & Potassium Greater Than 4). -  "Intravenous    Linked Group 3:  \"Or\" Linked Group Details        sodium phosphates 30 mmol in sodium chloride 0.9 % 250 mL IVPB 30 mmol As Needed 7/8/2018     Sig - Route: Infuse 30 mmol into a venous catheter As Needed (Peripheral IV - Phosphorus 1.3-1.7 & Potassium Greater Than 4). - Intravenous    Linked Group 3:  \"Or\" Linked Group Details        sodium phosphates 45 mmol in sodium chloride 0.9 % 500 mL IVPB 45 mmol As Needed 7/8/2018     Sig - Route: Infuse 45 mmol into a venous catheter As Needed (Peripheral IV - Phosphorus Less Than 1.3 & Potassium Greater Than 4). - Intravenous    Linked Group 3:  \"Or\" Linked Group Details        vancomycin (VANCOCIN) 750 mg in sodium chloride 0.9 % 250 mL IVPB 750 mg Every 8 Hours 7/8/2018 7/15/2018    Sig - Route: Infuse 750 mg into a venous catheter Every 8 (Eight) Hours. - Intravenous    piperacillin-tazobactam (ZOSYN) 4.5 g/100 mL 0.9% NS IVPB (mbp) (Discontinued) 4.5 g Every 8 Hours 7/8/2018 7/9/2018    Sig - Route: Infuse 100 mL into a venous catheter Every 8 (Eight) Hours. - Intravenous              Assessment/Plan     ASSESSMENT:    1.  Severe Sepsis with lactic acid greater than 2 on admission  2.  Bacteremia  3.  Acute pyelonephritis    PLAN:    Patient presents with bilateral lower extremity pain and fever.  Upon admission patient was found to have a fever of 103.  Patient is currently hypotensive requiring fluid boluses.  Lactic acid 1.2 improving from 2.6 on admission.  WBC 17.91.  Improving CRP of 10.5.  Blood cultures from 7/8/18 1 of 2 sets preliminary shows growth of Enterobacter.  Urine culture preliminary shows greater than 100,000 colonies of gram-negative bacilli consistent with Escherichia coli/Citrobacter.  Influenza screen negative.  Streptococcal screen negative.    In the setting of gram-negative bacteremia and urine culture preliminary results we recommend dual gram-negative coverage at this time.  Patient's antibiotic therapy was changed to " cefepime 2 g IV every 12 hours and gentamicin 3 mg/kg IV every 24 hours.  With hopes to de-escalate seen based on susceptibilities.  CRP ordered for a.m.    Current Antimicrobials:  Cefepime 2 gm IV Q12H  Gentamicin 3mg/kg IV Q24H      Patient's findings and recommendations were discussed with patient, nursing staff, primary care team and consulting provider    Code Status:   Code Status and Medical Interventions:   Ordered at: 07/08/18 1014     Code Status:    CPR     Medical Interventions (Level of Support Prior to Arrest):    Full       VON Dubois  07/09/18  10:16 AM

## 2018-07-09 NOTE — PHARMACY RECOMMENDATION
May advance the cefepime dose to 2gm q 8 hrs if clinically indicated with clearance of 97 ml/min.  .

## 2018-07-09 NOTE — PLAN OF CARE
Problem: Patient Care Overview  Goal: Plan of Care Review  Outcome: Ongoing (interventions implemented as appropriate)    Goal: Individualization and Mutuality  Outcome: Ongoing (interventions implemented as appropriate)    Goal: Discharge Needs Assessment  Outcome: Ongoing (interventions implemented as appropriate)    Goal: Interprofessional Rounds/Family Conf  Outcome: Ongoing (interventions implemented as appropriate)      Problem: Sepsis/Septic Shock (Adult)  Goal: Signs and Symptoms of Listed Potential Problems Will be Absent, Minimized or Managed (Sepsis/Septic Shock)  Outcome: Ongoing (interventions implemented as appropriate)      Problem: Fall Risk (Adult)  Goal: Identify Related Risk Factors and Signs and Symptoms  Outcome: Ongoing (interventions implemented as appropriate)    Goal: Absence of Fall  Outcome: Ongoing (interventions implemented as appropriate)      Problem: Skin Injury Risk (Adult)  Goal: Identify Related Risk Factors and Signs and Symptoms  Outcome: Ongoing (interventions implemented as appropriate)    Goal: Skin Health and Integrity  Outcome: Ongoing (interventions implemented as appropriate)      Problem: Pain, Acute (Adult)  Goal: Identify Related Risk Factors and Signs and Symptoms  Outcome: Ongoing (interventions implemented as appropriate)    Goal: Acceptable Pain Control/Comfort Level  Outcome: Ongoing (interventions implemented as appropriate)

## 2018-07-09 NOTE — PROGRESS NOTES
Subjective     History:   Emely Gamble is a 37 y.o. female admitted on 7/8/2018 secondary to Sepsis (CMS/[facility])     Procedures:   7/8/18: Right subclavian central line placement     Patient seen and examined with ASHU Chanel. Awake and alert. States she feels anxious and states she is craving Suboxone. Denies CP, dyspnea or palpitations. Denies nausea or vomiting. Denies fever or chills. Denies diarrhea. RN reports BP borderline low overnight but she did not require vasopressors. No acute events overnight per RN.     History taken from: patient, chart, and RN.      Objective     Vital Signs  Temp:  [97.5 °F (36.4 °C)-99.9 °F (37.7 °C)] 98.3 °F (36.8 °C)  Heart Rate:  [51-90] 60  Resp:  [9-74] 13  BP: ()/(38-88) 116/84    Intake/Output Summary (Last 24 hours) at 07/09/18 0931  Last data filed at 07/09/18 0555   Gross per 24 hour   Intake          8290.41 ml   Output             1100 ml   Net          7190.41 ml         Physical Exam:  General:    Awake, alert, in no acute distress   Heart:      Normal S1 and S2. Regular rate and rhythm. No significant murmur, rubs or gallops appreciated.   Lungs:     Respirations regular, even and unlabored. Lungs clear to auscultation B/L. No wheezes, rales or rhonchi.   Abdomen:   Soft and nontender. No guarding, rebound tenderness or  organomegaly noted. Bowel sounds present x 4.   Extremities:  No clubbing, cyanosis or edema noted. Moves UE and LE equally B/L.     Results Review:      Results from last 7 days  Lab Units 07/09/18  0059 07/08/18  0755   WBC 10*3/mm3 17.91* 10.42   HEMOGLOBIN g/dL 9.8* 12.4   PLATELETS 10*3/mm3 102* 123*       Results from last 7 days  Lab Units 07/09/18  0059 07/08/18  0755   SODIUM mmol/L 137 138   POTASSIUM mmol/L 3.7 3.8   CHLORIDE mmol/L 114* 108   CO2 mmol/L 21.7* 24.0*   BUN mg/dL 15 16   CREATININE mg/dL 0.76 0.76   CALCIUM mg/dL 7.5* 8.5   GLUCOSE mg/dL 119* 110       Results from last 7 days  Lab Units 07/09/18  0059 07/08/18  0750    BILIRUBIN mg/dL 0.6 0.9   ALK PHOS U/L 141* 248*   AST (SGOT) U/L 47* 94*   ALT (SGPT) U/L 73* 113*       Results from last 7 days  Lab Units 07/09/18  0743 07/09/18  0059 07/08/18  0755   MAGNESIUM mg/dL 2.6 2.4 1.7           Results from last 7 days  Lab Units 07/08/18  0755   TROPONIN I ng/mL <0.006       Imaging Results (last 24 hours)     Procedure Component Value Units Date/Time    XR Chest 1 View [[id number]] Collected:  07/08/18 1953     Updated:  07/08/18 1956    Narrative:       XR CHEST 1 VW-     CLINICAL INDICATION: central line placement; A41.9-Sepsis, unspecified  organism; N39.0-Urinary tract infection, site not specified          COMPARISON: 07/08/2018      TECHNIQUE: Single frontal view of the chest.     FINDINGS:     Right-sided central line is been placed from a subclavian approach. The  tip is in the superior vena cava. There is no pneumothorax  The cardiac silhouette is normal. The pulmonary vasculature is  unremarkable.  There is no evidence of an acute osseous abnormality.   There are no suspicious-appearing parenchymal soft tissue nodules.            Impression:       Central line tip in the superior vena cava         This report was finalized on 7/8/2018 7:54 PM by Dr. Jose Manuel Lozada MD.       XR Chest 1 View [[id number]] Collected:  07/08/18 0932     Updated:  07/08/18 0934    Narrative:       EXAMINATION: XR CHEST 1 VW-      CLINICAL INDICATION:     Simple Sepsis triage protocol     TECHNIQUE:  XR CHEST 1 VW-      COMPARISON: NONE      FINDINGS:   The lungs remain aerated.  Heart and mediastinum contours are unremarkable.  No pleural effusion.  No pneumothorax.   Bony and soft tissue structures are unremarkable.       Impression:       No radiographic evidence of acute cardiac or pulmonary  disease.     This report was finalized on 7/8/2018 9:32 AM by Dr. Bahman Joe MD.               Medications:    heparin (porcine) 5,000 Units Subcutaneous Q12H   nicotine 1 patch Transdermal Daily    sodium chloride 1,000 mL Intravenous Once   vancomycin 750 mg Intravenous Q8H       norepinephrine 0.02-0.3 mcg/kg/min Last Rate: Stopped (07/08/18 1733)   Pharmacy Consult - Pharmacy to dose     Pharmacy Consult - Pharmacy to dose     sodium chloride 100 mL/hr Last Rate: 100 mL/hr (07/08/18 2113)           Assessment/Plan   Septic shock: Pt hypotensive despite initial fluid bolus. However, her BP improved with additional fluid and she did not require Levophed overnight. Likely 2/2 UTI and bacteremia. WBC is elevated this AM. CRP remains elevated as well. Lactic acid normalized. Initially started on Vanc and Zosyn. However, Zosyn was D/C'd last PM per nocturnist and she was given a dose of Merrem. ID has been consulted. Cont to follow cultures and repeat labs in the AM.     GNR UTI: Urine culture revealing growth consistent with Escherichia/Citrobacter. Antibiotics as above. Await final culture results.     Enterobacter bacteremia: BCID with 1/2 initial blood cultures revealing Enterobacter. Repeat blood cultures. ID consulted per protocol. Antibiotics as above.     IV drug abuse: Pt admits to illicit drug use with suboxone. States she is craving suboxone at this time and feels that she is starting to experience withdrawals. Will consult psychiatry. I offered information regarding substance abuse programs but she does not express any interest at this time. Cont supportive treatment.     Electrolyte abnormalities: Mg improved with supplementation. PO4 is low and being repleted per protocol. Repeat labs in the AM.     Elevated liver enzymes: Pt has known hx of Hep C. Hep A IgM equivocal. HIV is non-reactive. Sepsis likely contributing to enzyme elevation. Improved today. Will need outpatient follow up with GI. Repeat CMP in the AM.     Anemia: Likely dilutional as she has received aggressive fluid resuscitation. No current signs of bleeding. Repeat CBC in the AM.     Thrombocytopenia: Likely 2/2 above. If platelets  continue to drop will need to stop SQ heparin.     Tobacco abuse: Pt has been counseled on smoking cessation. Cont Nicotine patch.     DVT PPX: SQ heparin    Transfer to PCU.     Pt is at high risk 2/2 septic shock, UTI, bacteremia, IVDA and electrolyte abnormalities.      Rylan Cervantes,   07/09/18  9:31 AM

## 2018-07-10 VITALS
HEIGHT: 64 IN | TEMPERATURE: 97.7 F | HEART RATE: 64 BPM | DIASTOLIC BLOOD PRESSURE: 85 MMHG | SYSTOLIC BLOOD PRESSURE: 117 MMHG | WEIGHT: 155.8 LBS | OXYGEN SATURATION: 98 % | BODY MASS INDEX: 26.6 KG/M2 | RESPIRATION RATE: 12 BRPM

## 2018-07-10 LAB
ALBUMIN SERPL-MCNC: 2.7 G/DL (ref 3.5–5)
ALBUMIN/GLOB SERPL: 1 G/DL (ref 1.5–2.5)
ALP SERPL-CCNC: 145 U/L (ref 35–104)
ALT SERPL W P-5'-P-CCNC: 70 U/L (ref 10–36)
ANION GAP SERPL CALCULATED.3IONS-SCNC: 3.2 MMOL/L (ref 3.6–11.2)
AST SERPL-CCNC: 44 U/L (ref 10–30)
BACTERIA SPEC AEROBE CULT: ABNORMAL
BACTERIA SPEC AEROBE CULT: NORMAL
BASOPHILS # BLD AUTO: 0.03 10*3/MM3 (ref 0–0.3)
BASOPHILS NFR BLD AUTO: 0.4 % (ref 0–2)
BILIRUB SERPL-MCNC: 0.5 MG/DL (ref 0.2–1.8)
BUN BLD-MCNC: 8 MG/DL (ref 7–21)
BUN/CREAT SERPL: 11.4 (ref 7–25)
CALCIUM SPEC-SCNC: 8 MG/DL (ref 7.7–10)
CHLORIDE SERPL-SCNC: 115 MMOL/L (ref 99–112)
CO2 SERPL-SCNC: 21.8 MMOL/L (ref 24.3–31.9)
CREAT BLD-MCNC: 0.7 MG/DL (ref 0.43–1.29)
CRP SERPL-MCNC: 6.08 MG/DL (ref 0–0.99)
DEPRECATED RDW RBC AUTO: 52.3 FL (ref 37–54)
EOSINOPHIL # BLD AUTO: 0.18 10*3/MM3 (ref 0–0.7)
EOSINOPHIL NFR BLD AUTO: 2.5 % (ref 0–5)
ERYTHROCYTE [DISTWIDTH] IN BLOOD BY AUTOMATED COUNT: 14.8 % (ref 11.5–14.5)
GFR SERPL CREATININE-BSD FRML MDRD: 95 ML/MIN/1.73
GLOBULIN UR ELPH-MCNC: 2.7 GM/DL
GLUCOSE BLD-MCNC: 87 MG/DL (ref 70–110)
HCT VFR BLD AUTO: 33.1 % (ref 37–47)
HGB BLD-MCNC: 10.4 G/DL (ref 12–16)
IMM GRANULOCYTES # BLD: 0.01 10*3/MM3 (ref 0–0.03)
IMM GRANULOCYTES NFR BLD: 0.1 % (ref 0–0.5)
LYMPHOCYTES # BLD AUTO: 2.52 10*3/MM3 (ref 1–3)
LYMPHOCYTES NFR BLD AUTO: 34.8 % (ref 21–51)
MAGNESIUM SERPL-MCNC: 2 MG/DL (ref 1.7–2.6)
MCH RBC QN AUTO: 31.4 PG (ref 27–33)
MCHC RBC AUTO-ENTMCNC: 31.4 G/DL (ref 33–37)
MCV RBC AUTO: 100 FL (ref 80–94)
MONOCYTES # BLD AUTO: 0.34 10*3/MM3 (ref 0.1–0.9)
MONOCYTES NFR BLD AUTO: 4.7 % (ref 0–10)
NEUTROPHILS # BLD AUTO: 4.17 10*3/MM3 (ref 1.4–6.5)
NEUTROPHILS NFR BLD AUTO: 57.5 % (ref 30–70)
OSMOLALITY SERPL CALC.SUM OF ELEC: 277.1 MOSM/KG (ref 273–305)
PHOSPHATE SERPL-MCNC: 3.4 MG/DL (ref 2.7–4.5)
PHOSPHATE SERPL-MCNC: 3.7 MG/DL (ref 2.7–4.5)
PLATELET # BLD AUTO: 115 10*3/MM3 (ref 130–400)
PMV BLD AUTO: 11.8 FL (ref 6–10)
POTASSIUM BLD-SCNC: 4.6 MMOL/L (ref 3.5–5.3)
PROT SERPL-MCNC: 5.4 G/DL (ref 6–8)
RBC # BLD AUTO: 3.31 10*6/MM3 (ref 4.2–5.4)
SODIUM BLD-SCNC: 140 MMOL/L (ref 135–153)
WBC NRBC COR # BLD: 7.25 10*3/MM3 (ref 4.5–12.5)

## 2018-07-10 PROCEDURE — 99239 HOSP IP/OBS DSCHRG MGMT >30: CPT | Performed by: INTERNAL MEDICINE

## 2018-07-10 PROCEDURE — 84100 ASSAY OF PHOSPHORUS: CPT | Performed by: INTERNAL MEDICINE

## 2018-07-10 PROCEDURE — 25010000002 CEFEPIME: Performed by: NURSE PRACTITIONER

## 2018-07-10 PROCEDURE — 83735 ASSAY OF MAGNESIUM: CPT | Performed by: INTERNAL MEDICINE

## 2018-07-10 PROCEDURE — 86140 C-REACTIVE PROTEIN: CPT | Performed by: INTERNAL MEDICINE

## 2018-07-10 PROCEDURE — 25010000002 HEPARIN (PORCINE) PER 1000 UNITS: Performed by: PHYSICIAN ASSISTANT

## 2018-07-10 PROCEDURE — 80053 COMPREHEN METABOLIC PANEL: CPT | Performed by: INTERNAL MEDICINE

## 2018-07-10 PROCEDURE — 85025 COMPLETE CBC W/AUTO DIFF WBC: CPT | Performed by: INTERNAL MEDICINE

## 2018-07-10 RX ORDER — CEFDINIR 300 MG/1
300 CAPSULE ORAL EVERY 12 HOURS SCHEDULED
Qty: 20 CAPSULE | Refills: 0 | Status: SHIPPED | OUTPATIENT
Start: 2018-07-10 | End: 2018-07-20

## 2018-07-10 RX ORDER — L.ACID,PARA/B.BIFIDUM/S.THERM 8B CELL
1 CAPSULE ORAL DAILY
Qty: 10 CAPSULE | Refills: 0 | Status: SHIPPED | OUTPATIENT
Start: 2018-07-11 | End: 2018-07-21

## 2018-07-10 RX ORDER — CEFDINIR 300 MG/1
300 CAPSULE ORAL EVERY 12 HOURS SCHEDULED
Status: DISCONTINUED | OUTPATIENT
Start: 2018-07-10 | End: 2018-07-10 | Stop reason: HOSPADM

## 2018-07-10 RX ADMIN — POTASSIUM PHOSPHATE, MONOBASIC AND POTASSIUM PHOSPHATE, DIBASIC 15 MMOL: 224; 236 INJECTION, SOLUTION INTRAVENOUS at 01:54

## 2018-07-10 RX ADMIN — HEPARIN SODIUM 5000 UNITS: 5000 INJECTION, SOLUTION INTRAVENOUS; SUBCUTANEOUS at 08:39

## 2018-07-10 RX ADMIN — Medication 1 CAPSULE: at 08:39

## 2018-07-10 RX ADMIN — CEFEPIME 2 G: 2 INJECTION, POWDER, FOR SOLUTION INTRAVENOUS at 00:03

## 2018-07-10 RX ADMIN — SODIUM CHLORIDE 100 ML/HR: 9 INJECTION, SOLUTION INTRAVENOUS at 04:44

## 2018-07-10 RX ADMIN — IBUPROFEN 400 MG: 400 TABLET, FILM COATED ORAL at 09:54

## 2018-07-10 RX ADMIN — POTASSIUM PHOSPHATE, MONOBASIC AND POTASSIUM PHOSPHATE, DIBASIC 15 MMOL: 224; 236 INJECTION, SOLUTION INTRAVENOUS at 01:46

## 2018-07-10 RX ADMIN — NICOTINE 1 PATCH: 21 PATCH TRANSDERMAL at 08:40

## 2018-07-10 RX ADMIN — CEFDINIR 300 MG: 300 CAPSULE ORAL at 09:54

## 2018-07-10 NOTE — DISCHARGE SUMMARY
Date of Admission: 7/8/2018    Date of Discharge: 7/10/2018     PCP: PAPITO Pacheco    Admission Diagnosis:   Please see admission H&P    Discharge Diagnosis:   Septic shock  Enterobacter bacteremia  E coli UTI  IV drug abuse  Hypomagnesemia  Hypophosphatemia  Elevated liver enzymes  Hx of Hep C  Anemia, likely dilutional   Thrombocytopenia  Tobacco abuse    Procedures Performed:  7/8/18: Right subclavian central line placement     Consults:   Consults     Date and Time Order Name Status Description    7/9/2018 0836 Inpatient Psychiatrist Consult Completed     7/9/2018 0450 Inpatient Infectious Diseases Consult Completed     7/8/2018 1422 Inpatient General Surgery Consult      7/8/2018 0959 Hospitalist (on-call MD unless specified)              History of Present Illness:  Emely Gamble is a 37 y.o. female who presented to [facility] [place] ED with CC of fever and aching pain in both of her legs. Please see admission H&P for complete details.     In the ED, she was febrile and hypotensive. Routine labs revealed an elevated lactic acid, elevated CRP and elevated liver enzymes. UA was suggestive of a UTI. CXR did not reveal any evidence of pneumonia. Influenza and Monospot were negative. UDS was positive for buprenorphine. Cultures were obtained and IV antibiotics initiated. She was bolused with IV fluids per sepsis protocol.        Hospital Course  Emely Gamble was admitted to the CCU for further evaluation and treatment. She was continued on broad spectrum IV antibiotics including Vanc and Zosyn. She was continued on aggressive maintenance fluids. Her electrolytes were checked and electrolyte replacement protocols were initiated. She was started on a Nicotine patch at her request.     Upon arrival to the CCU she remained hypotensive but was relatively asymptomatic. General surgery was consulted for central line placement to begin vasopressors as she had progressed to shock with persistent hypotension despite  appropriate fluid resuscitation. She was given additional boluses of IV fluids with subsequent improvement in her hypotension and would not require initiation of vasopressor support. Her lactic acid normalized on reflex labs. Her WBC initially blayne but later returned to normal on the day of discharge. Her CRP would improve throughout her hospitalization.     One of two initial blood cultures revealed Enterobacter on blood culture ID. ID was consulted per protocol and adjusted her antibiotic regimen to include Cefepime and Gentamicin. Repeat blood cultures were obtained with no growth at the time of discharge. She began reporting cravings and feelings of withdrawal. However, no objective signs of withdrawal were observed. Psychiatry was consulted for further input. They did not feel she would be likely to develop severe withdrawals if her reports of the quantity and frequency of her use were correct. She did not express any desire to stop her Suboxone use and expressed interest in outpatient treatment in a Suboxone clinic.  She was transferred out of the CCU once her BP stabilized with no signs of withdrawal.     Ms. De Oliveira was seen and examined with ASHU Urban on 7/10/18. She remained hemodynamically stable with routine AM labs revealing resolution of her leukocytosis and improvement in her CRP level. She felt significantly improved from upon admission and deemed medically stable for discharge after discussion with ID as they had deescalated her antibiotic regimen to PO Omnicef. She was again counseled on the importance of smoking cessation and cessation of her substance abuse. She was encouraged to establish care with GI to discuss possible treatment of her Hep C and she was made aware that she would need to stop her substance abuse in order to seek treatment. She was instructed to follow up with her PCP in 1 week.     Condition on Discharge:  Stable    Vital Signs  Vitals:    07/10/18 0800   BP:    Pulse:    Resp:     Temp: 97.7 °F (36.5 °C)   SpO2:        Physical Exam:  General:    Awake, alert, in no acute distress   Heart:      Normal S1 and S2. Regular rate and rhythm. No significant murmur, rubs or gallops appreciated.   Lungs:     Respirations regular, even and unlabored. Lungs clear to auscultation B/L. No wheezes, rales or rhonchi.   Abdomen:   Soft and nontender. No guarding, rebound tenderness or  organomegaly noted. Bowel sounds present x 4.   Extremities:  No clubbing, cyanosis or edema noted. Moves UE and LE equally B/L.     Discharge Disposition:   home      Discharge Medications:     Discharge Medications      New Medications      Instructions Start Date   cefdinir 300 MG capsule  Commonly known as:  OMNICEF   300 mg, Oral, Every 12 Hours Scheduled      lactobacillus acidophilus capsule capsule   1 capsule, Oral, Daily   Start Date:  7/11/2018              Discharge Diet:   Dietary Orders     Start     Ordered    07/08/18 1140  Diet Regular  Diet Effective Now     Question:  Diet Texture / Consistency  Answer:  Regular    07/08/18 1139          Activity at Discharge:  activity as tolerated    Follow-up Appointments:  Additional Instructions for the Follow-ups that You Need to Schedule     Discharge Follow-up with PCP    As directed      Currently Documented PCP:  PAPITO Pacheco  PCP Phone Number:  [phone]    Follow Up Details:  Follow up with France Gomez PA-C in 1 week.           Follow-up Information     PAPITO Pacheco .    Specialty:  Physician Assistant  Why:  Follow up with France Gomez PA-C in 1 week.  Contact information:  [address] [place] [id number] [phone]                       Test Results Pending at Discharge:  None     Rylan Cervantes DO  07/10/18  9:15 AM      Time: Greater than 30 minutes spent on this discharge.

## 2018-07-10 NOTE — PROGRESS NOTES
"  I have personally seen and examined the patient today and discussed overnight interval progress and pertinent issues with nursing staff.    Subjective:    Patient significantly better this morning.  CRP significantly improved at 6.08.  WBC normal.  Urine culture from 7/8/18 finalized as greater than 100,000 colonies of Escherichia coli.  Blood cultures from 7/8/18 remain in progress.    History taken from: patient chart RN      Vital Signs    /85   Pulse 64   Temp 97.7 °F (36.5 °C) (Oral)   Resp 12   Ht 162.6 cm (64\")   Wt 70.7 kg (155 lb 12.8 oz)   SpO2 98%   BMI 26.74 kg/m²     Temp:  [97.6 °F (36.4 °C)-98.3 °F (36.8 °C)] 97.7 °F (36.5 °C)      Intake/Output Summary (Last 24 hours) at 07/10/18 1107  Last data filed at 07/10/18 0800   Gross per 24 hour   Intake             2535 ml   Output              800 ml   Net             1735 ml     Intake & Output (last 3 days)       07/07 0701 - 07/08 0700 07/08 0701 - 07/09 0700 07/09 0701 - 07/10 0700 07/10 0701 - 07/11 0700    P.O.  120 240 120    I.V. (mL/kg)  3525.4 (52.5) 1975 (27.9)     IV Piggyback  4645 200     Total Intake(mL/kg)  8290.4 (123.4) 2415 (34.2) 120 (1.7)    Urine (mL/kg/hr)  1100 1400 (0.8)     Stool   0 (0)     Total Output   1100 1400      Net   +7190.4 +1015 +120            Unmeasured Urine Occurrence  3 x 1 x     Unmeasured Stool Occurrence   1 x           Physical exam:     General Appearance:    Alert, cooperative, in no acute distress   Head:    Normocephalic, without obvious abnormality, atraumatic   Eyes:            Lids and lashes normal, conjunctivae and sclerae normal, no   icterus, no pallor, corneas clear, PERRLA   Ears:    Ears appear intact with no abnormalities noted   Throat:   No oral lesions, no thrush, oral mucosa moist   Neck:   No adenopathy, supple, trachea midline, no thyromegaly, no   carotid bruit, no JVD   Back:     No tenderness to percussion or palpation, range of motion   normal   Lungs:     Clear to " auscultation,respirations regular, even and unlabored. No wheezing, no ronchi and no crackles.    Heart:    Regular rhythm and normal rate, normal S1 and S2, no            murmur, no gallop, no rub, no click   Chest Wall:    No abnormalities observed   Abdomen:     Normal bowel sounds, no masses, no organomegaly, soft        non-tender, non-distended, no guarding, no rebound                tenderness   Rectal:     Deferred   Extremities:   Moves all extremities well, no edema, no cyanosis, no             redness   Pulses:   Pulses palpable and equal bilaterally   Skin:   No bleeding, bruising or rash   Lymph nodes:   No palpable adenopathy   Neurologic:   Awake, alert and oriented x 3. Following commands.           Results:      Results from last 7 days  Lab Units 07/10/18  0242 07/09/18  0059 07/08/18  0755   WBC 10*3/mm3 7.25 17.91* 10.42     Lab Results   Component Value Date    NEUTROABS 4.17 07/10/2018         Results from last 7 days  Lab Units 07/10/18  0242   CREATININE mg/dL 0.70         Results from last 7 days  Lab Units 07/10/18  0242 07/09/18  0059 07/08/18  0755   CRP mg/dL 6.08* 10.50* 10.73*       Imaging Results (last 24 hours)     ** No results found for the last 24 hours. **            Results Review:    I have personally reviewed laboratory data, culture results, radiology studies and antimicrobial therapy.    Hospital Medications (active)       Dose Frequency Start End    acetaminophen (TYLENOL) tablet 650 mg 650 mg Every 6 Hours PRN 7/8/2018     Sig - Route: Take 2 tablets by mouth Every 6 (Six) Hours As Needed for Mild Pain . - Oral    cefdinir (OMNICEF) capsule 300 mg 300 mg Every 12 Hours Scheduled 7/10/2018 7/20/2018    Sig - Route: Take 1 capsule by mouth Every 12 (Twelve) Hours. - Oral    cefepime (MAXIPIME) 2 g/100 mL 0.9% NS (mbp) 2 g Once 7/9/2018 7/9/2018    Sig - Route: Infuse 100 mL into a venous catheter 1 (One) Time. - Intravenous    heparin (porcine) 5000 UNIT/ML injection 5,000  "Units 5,000 Units Every 12 Hours Scheduled 7/8/2018     Sig - Route: Inject 1 mL under the skin Every 12 (Twelve) Hours. - Subcutaneous    Cosign for Ordering: Accepted by Rylan Cervantes DO on 7/8/2018  8:43 PM    hydrOXYzine (ATARAX) tablet 50 mg 50 mg Every 6 Hours PRN 7/8/2018     Sig - Route: Take 1 tablet by mouth Every 6 (Six) Hours As Needed for Anxiety or Sleep. - Oral    ibuprofen (ADVIL,MOTRIN) tablet 400 mg 400 mg Every 6 Hours PRN 7/8/2018     Sig - Route: Take 1 tablet by mouth Every 6 (Six) Hours As Needed for Mild Pain . - Oral    lactobacillus acidophilus (RISAQUAD) capsule 1 capsule 1 capsule Daily 7/9/2018     Sig - Route: Take 1 capsule by mouth Daily. - Oral    Magnesium Sulfate 2 gram / 50mL Infusion (GIVE X 3 BAGS TO EQUAL 6GM TOTAL DOSE) - Mg 1.1 - 1/5 mg/dl 2 g As Needed 7/8/2018     Sig - Route: Infuse 50 mL into a venous catheter As Needed (See Administration Instructions). - Intravenous    Linked Group 1:  \"Or\" Linked Group Details        Magnesium Sulfate 2 gram Bolus, followed by 8 gram infusion (total Mg dose 10 grams)- Mg less than or equal to 1mg/dL 2 g As Needed 7/8/2018     Sig - Route: Infuse 50 mL into a venous catheter As Needed (See Administration Instructions). - Intravenous    Linked Group 1:  \"Or\" Linked Group Details        Magnesium Sulfate 4 gram infusion- Mg 1.6-1.9 mg/dL 4 g As Needed 7/8/2018     Sig - Route: Infuse 100 mL into a venous catheter As Needed (See Administration Instructions). - Intravenous    Linked Group 1:  \"Or\" Linked Group Details        nicotine (NICODERM CQ) 21 MG/24HR patch 1 patch 1 patch Daily 7/8/2018     Sig - Route: Place 1 patch on the skin Daily. - Transdermal    norepinephrine (LEVOPHED) 8,000 mcg in sodium chloride 0.9 % 250 mL (32 mcg/mL) infusion 0.02-0.3 mcg/kg/min × 67.2 kg Titrated 7/8/2018     Sig - Route: Infuse 1.344-20.16 mcg/min into a venous catheter Dose Adjusted By Provider As Needed. - Intravenous    pneumococcal " "polysaccharide 23-valent (PNEUMOVAX-23) vaccine 0.5 mL 0.5 mL During Hospitalization 7/8/2018     Sig - Route: Inject 0.5 mL into the shoulder, thigh, or buttocks During Hospitalization for Immunization. - Intramuscular    Cosign for Ordering: Accepted by Rylan Cervantes DO on 7/8/2018  8:44 PM    potassium chloride (K-DUR,KLOR-CON) CR tablet 40 mEq 40 mEq As Needed 7/8/2018     Sig - Route: Take 2 tablets by mouth As Needed (potassium replacement.  see admin instructions). - Oral    Linked Group 2:  \"Or\" Linked Group Details        potassium chloride (KLOR-CON) packet 40 mEq 40 mEq As Needed 7/8/2018     Sig - Route: Take 40 mEq by mouth As Needed (potassium replacement, see admin instructions). - Oral    Linked Group 2:  \"Or\" Linked Group Details        potassium chloride 10 mEq in 100 mL IVPB 10 mEq Every 1 Hour PRN 7/8/2018     Sig - Route: Infuse 100 mL into a venous catheter Every 1 (One) Hour As Needed (potassium protocol PERIPHERAL - see admin instructions). - Intravenous    Linked Group 2:  \"Or\" Linked Group Details        potassium phosphate 15 mmol in sodium chloride 0.9 % 100 mL infusion 15 mmol As Needed 7/8/2018     Sig - Route: Infuse 15 mmol into a venous catheter As Needed (Peripheral IV - Phosphorus 1.8 - 2.5). - Intravenous    Linked Group 3:  \"Or\" Linked Group Details        potassium phosphate 15 mmol in sodium chloride 0.9 % 100 mL infusion 15 mmol Once 7/9/2018 7/10/2018    Sig - Route: Infuse 15 mmol into a venous catheter 1 (One) Time. - Intravenous    potassium phosphate 15 mmol in sodium chloride 0.9 % 100 mL infusion 15 mmol Once 7/9/2018     Sig - Route: Infuse 15 mmol into a venous catheter 1 (One) Time. - Intravenous    potassium phosphate 15 mmol in sodium chloride 0.9 % 100 mL infusion 15 mmol Once 7/10/2018 7/10/2018    Sig - Route: Infuse 15 mmol into a venous catheter 1 (One) Time. - Intravenous    potassium phosphate 30 mmol in sodium chloride 0.9 % 250 mL infusion 30 mmol " "As Needed 7/8/2018     Sig - Route: Infuse 30 mmol into a venous catheter As Needed (Peripheral IV - Phosphorus 1.3 - 1.7). - Intravenous    Linked Group 3:  \"Or\" Linked Group Details        potassium phosphate 45 mmol in sodium chloride 0.9 % 500 mL infusion 45 mmol As Needed 7/8/2018     Sig - Route: Infuse 45 mmol into a venous catheter As Needed (Peripheral IV - Phosphorus Less Than 1.3). - Intravenous    Linked Group 3:  \"Or\" Linked Group Details        sodium chloride 0.9 % bolus 1,000 mL 1,000 mL Once 7/8/2018     Sig - Route: Infuse 1,000 mL into a venous catheter 1 (One) Time. - Intravenous    sodium chloride 0.9 % flush 1-10 mL 1-10 mL As Needed 7/8/2018     Sig - Route: Infuse 1-10 mL into a venous catheter As Needed for Line Care. - Intravenous    Cosign for Ordering: Accepted by Rylan Cervantes DO on 7/8/2018  8:43 PM    sodium chloride 0.9 % flush 10 mL 10 mL As Needed 7/8/2018     Sig - Route: Infuse 10 mL into a venous catheter As Needed for Line Care. - Intravenous    Cosign for Ordering: Accepted by Guerrero Ibarra MD on 7/8/2018  7:59 AM    sodium chloride 0.9 % flush 10 mL 10 mL As Needed 7/8/2018     Sig - Route: Infuse 10 mL into a venous catheter As Needed for Line Care (Every 12 hours when not in use, After Medication Administration). - Intravenous    sodium chloride 0.9 % flush 10 mL 10 mL As Needed 7/8/2018     Sig - Route: Infuse 10 mL into a venous catheter As Needed for Line Care (Every 12 hours when not in use, After Medication Administration). - Intravenous    sodium chloride 0.9 % flush 10 mL 10 mL As Needed 7/8/2018     Sig - Route: 10 mL by Intracatheter route As Needed for Line Care (Every 12 hours when not in use, After Medication Administration). - Intracatheter    sodium chloride 0.9 % flush 10 mL 10 mL As Needed 7/8/2018     Sig - Route: Infuse 10 mL into a venous catheter As Needed for Line Care (Every 12 hours when not in use, After Medication Administration). - " "Intravenous    sodium chloride 0.9 % flush 10 mL 10 mL As Needed 7/8/2018     Sig - Route: Infuse 10 mL into a venous catheter As Needed for Line Care (Every 12 hours when not in use, After Medication Administration). - Intravenous    sodium chloride 0.9 % flush 20 mL 20 mL As Needed 7/8/2018     Sig - Route: Infuse 20 mL into a venous catheter As Needed for Line Care (After Blood Draws or Blood Product Administration). - Intravenous    sodium chloride 0.9 % infusion 100 mL/hr Continuous 7/8/2018     Sig - Route: Infuse 100 mL/hr into a venous catheter Continuous. - Intravenous    sodium phosphates 15 mmol in sodium chloride 0.9 % 250 mL IVPB 15 mmol As Needed 7/8/2018     Sig - Route: Infuse 15 mmol into a venous catheter As Needed (Peripheral IV - Phosphorus 1.8 - 2.5 & Potassium Greater Than 4). - Intravenous    Linked Group 3:  \"Or\" Linked Group Details        sodium phosphates 30 mmol in sodium chloride 0.9 % 250 mL IVPB 30 mmol As Needed 7/8/2018     Sig - Route: Infuse 30 mmol into a venous catheter As Needed (Peripheral IV - Phosphorus 1.3-1.7 & Potassium Greater Than 4). - Intravenous    Linked Group 3:  \"Or\" Linked Group Details        sodium phosphates 45 mmol in sodium chloride 0.9 % 500 mL IVPB 45 mmol As Needed 7/8/2018     Sig - Route: Infuse 45 mmol into a venous catheter As Needed (Peripheral IV - Phosphorus Less Than 1.3 & Potassium Greater Than 4). - Intravenous    Linked Group 3:  \"Or\" Linked Group Details        cefepime (MAXIPIME) 2 g/100 mL 0.9% NS (mbp) (Discontinued) 2 g Every 12 Hours 7/10/2018 7/10/2018    Sig - Route: Infuse 100 mL into a venous catheter Every 12 (Twelve) Hours. - Intravenous    gentamicin (GARAMYCIN) 160 mg in sodium chloride 0.9 % 100 mL IVPB (Discontinued) 3 mg/kg × 54.7 kg (Ideal) Every 24 Hours 7/9/2018 7/10/2018    Sig - Route: Infuse 160 mg into a venous catheter Daily. - Intravenous            Cultures:    Blood Culture   Date Value Ref Range Status   07/09/2018 " No growth at less than 24 hours  Preliminary   07/09/2018 No growth at 24 hours  Preliminary   07/08/2018 No growth at 2 days  Preliminary   07/08/2018 (A)  Preliminary    Gram Negative Bacilli, Morphology consistent with Klebsiela / Enterobacter     Urine Culture   Date Value Ref Range Status   07/08/2018 >100,000 CFU/mL Escherichia coli (A)  Final           Assessment/Plan     ASSESSMENT:    1.  Severe Sepsis with lactic acid greater than 2 on admission  2.  Bacteremia  3.  Acute pyelonephritis    PLAN:    Patient significantly better this morning.  CRP significantly improved at 6.08.  WBC normal.  Urine culture from 7/8/18 finalized as greater than 100,000 colonies of Escherichia coli.  Blood cultures from 7/8/18 remain in progress.    Blood cultures from 7/8/18 1 of 2 sets preliminary shows growth of Enterobacter.  Urine culture preliminary shows greater than 100,000 colonies of gram-negative bacilli consistent with Escherichia coli/Citrobacter.  Influenza screen negative.  Streptococcal screen negative.     In the setting of significant clinical and laboratory improvement in finalization of urine culture patient's antibiotic therapy was de-escalated to Omnicef 300 mg by mouth twice a day to continue through 7/19/18. Awaiting finalization of blood cultures. CRP ordered for AM.     Current Antimicrobials:  Omnicef 300mg PO BID 7/19/18      Patient's findings and recommendations were discussed with patient, nursing staff, primary care team and consulting provider    Code Status:   Code Status and Medical Interventions:   Ordered at: 07/08/18 1014     Code Status:    CPR     Medical Interventions (Level of Support Prior to Arrest):    Full       Scribed for Dr. Fernando Fabian.      VON Dubois  07/10/18  11:07 AM     Physician Attestation:    The documentation recorded by the scribe accurately reflects the service I personally performed and the decisions made by me.    Fernando Fabian MD  Infectious  Diseases  07/10/18  5:00 PM

## 2018-07-10 NOTE — NURSING NOTE
Report called to Sushila in PCU and patient moved via wheelchair to , accompanied by myself and lead RN.  Patient left in room on cardiac monitor with no complaints noted.

## 2018-07-10 NOTE — PLAN OF CARE
Problem: Patient Care Overview  Goal: Plan of Care Review   07/10/18 0327   Coping/Psychosocial   Plan of Care Reviewed With patient   Plan of Care Review   Progress improving       Problem: Sepsis/Septic Shock (Adult)  Goal: Signs and Symptoms of Listed Potential Problems Will be Absent, Minimized or Managed (Sepsis/Septic Shock)   07/10/18 0327   Goal/Outcome Evaluation   Problems Assessed (Sepsis) all   Problems Present (Sepsis) none       Problem: Fall Risk (Adult)  Goal: Absence of Fall   07/10/18 0327   Fall Risk (Adult)   Absence of Fall making progress toward outcome       Problem: Pain, Acute (Adult)  Goal: Acceptable Pain Control/Comfort Level   07/10/18 0327   Pain, Acute (Adult)   Acceptable Pain Control/Comfort Level making progress toward outcome

## 2018-07-12 LAB
BACTERIA BLD CULT: ABNORMAL
BACTERIA SPEC AEROBE CULT: ABNORMAL
GRAM STN SPEC: ABNORMAL
ISOLATED FROM: ABNORMAL

## 2018-07-14 LAB
BACTERIA SPEC AEROBE CULT: ABNORMAL
BACTERIA SPEC AEROBE CULT: NORMAL
BACTERIA SPEC AEROBE CULT: NORMAL
GRAM STN SPEC: ABNORMAL
ISOLATED FROM: ABNORMAL

## 2019-06-15 ENCOUNTER — HOSPITAL ENCOUNTER (EMERGENCY)
Facility: HOSPITAL | Age: 39
Discharge: HOME | End: 2019-06-15
Attending: EMERGENCY MEDICINE | Admitting: EMERGENCY MEDICINE

## 2019-06-15 VITALS
DIASTOLIC BLOOD PRESSURE: 90 MMHG | SYSTOLIC BLOOD PRESSURE: 128 MMHG | WEIGHT: 150 LBS | HEART RATE: 65 BPM | OXYGEN SATURATION: 97 % | HEIGHT: 64 IN | RESPIRATION RATE: 20 BRPM | BODY MASS INDEX: 25.61 KG/M2 | TEMPERATURE: 98.8 F

## 2019-06-15 DIAGNOSIS — L03.211 CELLULITIS OF RIGHT EXTERNAL CHEEK: Primary | ICD-10-CM

## 2019-06-15 PROCEDURE — 99283 EMERGENCY DEPT VISIT LOW MDM: CPT

## 2019-06-15 RX ORDER — HYDROCODONE BITARTRATE AND ACETAMINOPHEN 5; 325 MG/1; MG/1
1 TABLET ORAL ONCE
Status: COMPLETED | OUTPATIENT
Start: 2019-06-15 | End: 2019-06-15

## 2019-06-15 RX ORDER — CEPHALEXIN 250 MG/1
500 CAPSULE ORAL ONCE
Status: COMPLETED | OUTPATIENT
Start: 2019-06-15 | End: 2019-06-15

## 2019-06-15 RX ORDER — CEPHALEXIN 500 MG/1
500 CAPSULE ORAL 2 TIMES DAILY
Qty: 14 CAPSULE | Refills: 0 | OUTPATIENT
Start: 2019-06-15 | End: 2019-08-05

## 2019-06-15 RX ORDER — DICLOFENAC SODIUM 75 MG/1
75 TABLET, DELAYED RELEASE ORAL 2 TIMES DAILY PRN
Qty: 20 TABLET | Refills: 0 | OUTPATIENT
Start: 2019-06-15 | End: 2019-08-05

## 2019-06-15 RX ORDER — SULFAMETHOXAZOLE AND TRIMETHOPRIM 800; 160 MG/1; MG/1
1 TABLET ORAL 2 TIMES DAILY
Qty: 14 TABLET | Refills: 0 | OUTPATIENT
Start: 2019-06-15 | End: 2019-08-05

## 2019-06-15 RX ORDER — SULFAMETHOXAZOLE AND TRIMETHOPRIM 800; 160 MG/1; MG/1
1 TABLET ORAL ONCE
Status: COMPLETED | OUTPATIENT
Start: 2019-06-15 | End: 2019-06-15

## 2019-06-15 RX ADMIN — SULFAMETHOXAZOLE AND TRIMETHOPRIM 160 MG: 800; 160 TABLET ORAL at 05:49

## 2019-06-15 RX ADMIN — CEPHALEXIN 500 MG: 250 CAPSULE ORAL at 05:49

## 2019-06-15 RX ADMIN — HYDROCODONE BITARTRATE AND ACETAMINOPHEN 1 TABLET: 5; 325 TABLET ORAL at 05:49

## 2019-08-05 ENCOUNTER — HOSPITAL ENCOUNTER (EMERGENCY)
Facility: HOSPITAL | Age: 39
Discharge: HOME | End: 2019-08-05
Attending: EMERGENCY MEDICINE | Admitting: EMERGENCY MEDICINE

## 2019-08-05 VITALS
OXYGEN SATURATION: 100 % | SYSTOLIC BLOOD PRESSURE: 120 MMHG | TEMPERATURE: 100.7 F | BODY MASS INDEX: 26.66 KG/M2 | DIASTOLIC BLOOD PRESSURE: 81 MMHG | WEIGHT: 160 LBS | HEART RATE: 76 BPM | HEIGHT: 65 IN | RESPIRATION RATE: 20 BRPM

## 2019-08-05 DIAGNOSIS — N39.0 BACTERIAL UTI: Primary | ICD-10-CM

## 2019-08-05 DIAGNOSIS — A49.9 BACTERIAL UTI: Primary | ICD-10-CM

## 2019-08-05 LAB
6-ACETYL MORPHINE: NEGATIVE
ALBUMIN SERPL-MCNC: 3.64 G/DL (ref 3.5–5.2)
ALBUMIN/GLOB SERPL: 1.1 G/DL
ALP SERPL-CCNC: 101 U/L (ref 39–117)
ALT SERPL W P-5'-P-CCNC: 32 U/L (ref 1–33)
AMPHET+METHAMPHET UR QL: POSITIVE
ANION GAP SERPL CALCULATED.3IONS-SCNC: 16.4 MMOL/L (ref 5–15)
AST SERPL-CCNC: 27 U/L (ref 1–32)
BACTERIA UR QL AUTO: ABNORMAL /HPF
BARBITURATES UR QL SCN: NEGATIVE
BASOPHILS # BLD AUTO: 0 10*3/MM3 (ref 0–0.2)
BASOPHILS NFR BLD AUTO: 0 % (ref 0–1.5)
BENZODIAZ UR QL SCN: NEGATIVE
BILIRUB SERPL-MCNC: 0.5 MG/DL (ref 0.2–1.2)
BILIRUB UR QL STRIP: NEGATIVE
BUN BLD-MCNC: 9 MG/DL (ref 6–20)
BUN/CREAT SERPL: 13.6 (ref 7–25)
BUPRENORPHINE SERPL-MCNC: POSITIVE NG/ML
CALCIUM SPEC-SCNC: 8.8 MG/DL (ref 8.6–10.5)
CANNABINOIDS SERPL QL: NEGATIVE
CHLORIDE SERPL-SCNC: 98 MMOL/L (ref 98–107)
CLARITY UR: ABNORMAL
CO2 SERPL-SCNC: 20.6 MMOL/L (ref 22–29)
COCAINE UR QL: NEGATIVE
COLOR UR: YELLOW
CREAT BLD-MCNC: 0.66 MG/DL (ref 0.57–1)
D-LACTATE SERPL-SCNC: 1.7 MMOL/L (ref 0.5–2)
DEPRECATED RDW RBC AUTO: 47.7 FL (ref 37–54)
EOSINOPHIL # BLD AUTO: 0.01 10*3/MM3 (ref 0–0.4)
EOSINOPHIL NFR BLD AUTO: 0.1 % (ref 0.3–6.2)
ERYTHROCYTE [DISTWIDTH] IN BLOOD BY AUTOMATED COUNT: 13.7 % (ref 12.3–15.4)
GFR SERPL CREATININE-BSD FRML MDRD: 101 ML/MIN/1.73
GLOBULIN UR ELPH-MCNC: 3.5 GM/DL
GLUCOSE BLD-MCNC: 107 MG/DL (ref 65–99)
GLUCOSE UR STRIP-MCNC: NEGATIVE MG/DL
HCT VFR BLD AUTO: 37.7 % (ref 34–46.6)
HGB BLD-MCNC: 12.2 G/DL (ref 12–15.9)
HGB UR QL STRIP.AUTO: ABNORMAL
HYALINE CASTS UR QL AUTO: ABNORMAL /LPF
IMM GRANULOCYTES # BLD AUTO: 0.01 10*3/MM3 (ref 0–0.05)
IMM GRANULOCYTES NFR BLD AUTO: 0.1 % (ref 0–0.5)
KETONES UR QL STRIP: NEGATIVE
LEUKOCYTE ESTERASE UR QL STRIP.AUTO: ABNORMAL
LYMPHOCYTES # BLD AUTO: 1.04 10*3/MM3 (ref 0.7–3.1)
LYMPHOCYTES NFR BLD AUTO: 11.8 % (ref 19.6–45.3)
MCH RBC QN AUTO: 31.8 PG (ref 26.6–33)
MCHC RBC AUTO-ENTMCNC: 32.4 G/DL (ref 31.5–35.7)
MCV RBC AUTO: 98.2 FL (ref 79–97)
METHADONE UR QL SCN: NEGATIVE
MONOCYTES # BLD AUTO: 0.73 10*3/MM3 (ref 0.1–0.9)
MONOCYTES NFR BLD AUTO: 8.3 % (ref 5–12)
NEUTROPHILS # BLD AUTO: 7.05 10*3/MM3 (ref 1.7–7)
NEUTROPHILS NFR BLD AUTO: 79.7 % (ref 42.7–76)
NITRITE UR QL STRIP: POSITIVE
OPIATES UR QL: NEGATIVE
OXYCODONE UR QL SCN: NEGATIVE
PCP UR QL SCN: NEGATIVE
PH UR STRIP.AUTO: 6 [PH] (ref 5–8)
PLATELET # BLD AUTO: 164 10*3/MM3 (ref 140–450)
PMV BLD AUTO: 10.2 FL (ref 6–12)
POTASSIUM BLD-SCNC: 3.8 MMOL/L (ref 3.5–5.2)
PROT SERPL-MCNC: 7.1 G/DL (ref 6–8.5)
PROT UR QL STRIP: ABNORMAL
RBC # BLD AUTO: 3.84 10*6/MM3 (ref 3.77–5.28)
RBC # UR: ABNORMAL /HPF
REF LAB TEST METHOD: ABNORMAL
SODIUM BLD-SCNC: 135 MMOL/L (ref 136–145)
SP GR UR STRIP: 1.02 (ref 1–1.03)
SQUAMOUS #/AREA URNS HPF: ABNORMAL /HPF
UROBILINOGEN UR QL STRIP: ABNORMAL
WBC NRBC COR # BLD: 8.84 10*3/MM3 (ref 3.4–10.8)
WBC UR QL AUTO: ABNORMAL /HPF

## 2019-08-05 PROCEDURE — 85025 COMPLETE CBC W/AUTO DIFF WBC: CPT | Performed by: PHYSICIAN ASSISTANT

## 2019-08-05 PROCEDURE — 80307 DRUG TEST PRSMV CHEM ANLYZR: CPT | Performed by: PHYSICIAN ASSISTANT

## 2019-08-05 PROCEDURE — 25010000002 CEFTRIAXONE: Performed by: PHYSICIAN ASSISTANT

## 2019-08-05 PROCEDURE — 25010000002 KETOROLAC TROMETHAMINE PER 15 MG: Performed by: PHYSICIAN ASSISTANT

## 2019-08-05 PROCEDURE — 80053 COMPREHEN METABOLIC PANEL: CPT | Performed by: PHYSICIAN ASSISTANT

## 2019-08-05 PROCEDURE — 87186 SC STD MICRODIL/AGAR DIL: CPT | Performed by: PHYSICIAN ASSISTANT

## 2019-08-05 PROCEDURE — 87040 BLOOD CULTURE FOR BACTERIA: CPT | Performed by: PHYSICIAN ASSISTANT

## 2019-08-05 PROCEDURE — 87086 URINE CULTURE/COLONY COUNT: CPT | Performed by: PHYSICIAN ASSISTANT

## 2019-08-05 PROCEDURE — 96365 THER/PROPH/DIAG IV INF INIT: CPT

## 2019-08-05 PROCEDURE — 83605 ASSAY OF LACTIC ACID: CPT | Performed by: PHYSICIAN ASSISTANT

## 2019-08-05 PROCEDURE — 25010000002 ONDANSETRON PER 1 MG: Performed by: PHYSICIAN ASSISTANT

## 2019-08-05 PROCEDURE — 99284 EMERGENCY DEPT VISIT MOD MDM: CPT

## 2019-08-05 PROCEDURE — 36415 COLL VENOUS BLD VENIPUNCTURE: CPT

## 2019-08-05 PROCEDURE — 81001 URINALYSIS AUTO W/SCOPE: CPT | Performed by: PHYSICIAN ASSISTANT

## 2019-08-05 PROCEDURE — 96375 TX/PRO/DX INJ NEW DRUG ADDON: CPT

## 2019-08-05 RX ORDER — ONDANSETRON 2 MG/ML
4 INJECTION INTRAMUSCULAR; INTRAVENOUS ONCE
Status: COMPLETED | OUTPATIENT
Start: 2019-08-05 | End: 2019-08-05

## 2019-08-05 RX ORDER — SODIUM CHLORIDE 0.9 % (FLUSH) 0.9 %
10 SYRINGE (ML) INJECTION AS NEEDED
Status: DISCONTINUED | OUTPATIENT
Start: 2019-08-05 | End: 2019-08-05 | Stop reason: HOSPADM

## 2019-08-05 RX ORDER — KETOROLAC TROMETHAMINE 30 MG/ML
30 INJECTION, SOLUTION INTRAMUSCULAR; INTRAVENOUS ONCE
Status: COMPLETED | OUTPATIENT
Start: 2019-08-05 | End: 2019-08-05

## 2019-08-05 RX ORDER — BUSPIRONE HYDROCHLORIDE 5 MG/1
5 TABLET ORAL 2 TIMES DAILY
COMMUNITY
End: 2022-05-05

## 2019-08-05 RX ORDER — VENLAFAXINE 37.5 MG/1
37.5 TABLET ORAL DAILY
COMMUNITY
End: 2022-05-05

## 2019-08-05 RX ORDER — ONDANSETRON 4 MG/1
4 TABLET, ORALLY DISINTEGRATING ORAL EVERY 6 HOURS PRN
Qty: 10 TABLET | Refills: 0 | Status: SHIPPED | OUTPATIENT
Start: 2019-08-05

## 2019-08-05 RX ORDER — CEFDINIR 300 MG/1
300 CAPSULE ORAL 2 TIMES DAILY
Qty: 14 CAPSULE | Refills: 0 | Status: SHIPPED | OUTPATIENT
Start: 2019-08-05 | End: 2019-08-12

## 2019-08-05 RX ORDER — IBUPROFEN 600 MG/1
600 TABLET ORAL DAILY PRN
COMMUNITY
End: 2022-04-29

## 2019-08-05 RX ADMIN — SODIUM CHLORIDE 1000 ML: 9 INJECTION, SOLUTION INTRAVENOUS at 07:32

## 2019-08-05 RX ADMIN — ONDANSETRON 4 MG: 2 INJECTION, SOLUTION INTRAMUSCULAR; INTRAVENOUS at 07:33

## 2019-08-05 RX ADMIN — CEFTRIAXONE 2 G: 2 INJECTION, POWDER, FOR SOLUTION INTRAMUSCULAR; INTRAVENOUS at 08:23

## 2019-08-05 RX ADMIN — KETOROLAC TROMETHAMINE 30 MG: 30 INJECTION, SOLUTION INTRAMUSCULAR at 07:33

## 2019-08-05 NOTE — ED NOTES
Patient leaving ambulatory alert and oriented x4. Patient still having slight pain on left side. Patient states she is feeling better and is aware of her discharge plan.      Sonny Martins RN  08/05/19 0988

## 2019-08-05 NOTE — ED PROVIDER NOTES
Subjective   This is a 38 year old female patient who presents to the ER with chief complaint of low back pain that started 3 days ago. It is now radiating into her bilateral flanks. She has associated urinary frequency and urgency but denies dysuria and hematuria. She also has nausea and vomiting but denies diarrhea and constipation. She has had a low grade fever at home of 99.9 at the highest. She has no history of kidney stones but does have a history of frequent UTIs.             Review of Systems   Constitutional: Negative.  Negative for fever.   HENT: Negative.    Respiratory: Negative.    Cardiovascular: Negative.  Negative for chest pain.   Gastrointestinal: Negative.  Negative for abdominal pain.   Endocrine: Negative.    Genitourinary: Positive for flank pain, frequency and urgency. Negative for decreased urine volume, difficulty urinating, dyspareunia, dysuria, enuresis, genital sores, hematuria, menstrual problem, pelvic pain, vaginal bleeding, vaginal discharge and vaginal pain.   Musculoskeletal: Positive for back pain. Negative for arthralgias, gait problem, joint swelling, myalgias, neck pain and neck stiffness.   Skin: Negative.    Neurological: Negative.    Psychiatric/Behavioral: Negative.    All other systems reviewed and are negative.      Past Medical History:   Diagnosis Date   • Hepatitis C virus        No Known Allergies    No past surgical history on file.    No family history on file.    Social History     Socioeconomic History   • Marital status: Single     Spouse name: Not on file   • Number of children: Not on file   • Years of education: Not on file   • Highest education level: Not on file   Tobacco Use   • Smoking status: Current Every Day Smoker     Packs/day: 0.25     Years: 10.00     Pack years: 2.50     Types: Cigarettes   • Smokeless tobacco: Never Used   Substance and Sexual Activity   • Drug use: Yes     Types: IV           Objective   Physical Exam   Constitutional: She is  oriented to person, place, and time. She appears well-developed and well-nourished. No distress.   HENT:   Head: Normocephalic and atraumatic.   Right Ear: External ear normal.   Left Ear: External ear normal.   Nose: Nose normal.   Eyes: Conjunctivae and EOM are normal. Pupils are equal, round, and reactive to light.   Neck: Normal range of motion. Neck supple. No JVD present. No tracheal deviation present.   Cardiovascular: Normal rate, regular rhythm and normal heart sounds.   No murmur heard.  Pulmonary/Chest: Effort normal and breath sounds normal. No respiratory distress. She has no wheezes.   Abdominal: Soft. Bowel sounds are normal. She exhibits no distension and no mass. There is no tenderness. There is no rebound and no guarding. No hernia.   Musculoskeletal: Normal range of motion. She exhibits no edema, tenderness or deformity.   Neurological: She is alert and oriented to person, place, and time. No cranial nerve deficit.   Skin: Skin is warm and dry. No rash noted. She is not diaphoretic. No erythema. No pallor.   Psychiatric: She has a normal mood and affect. Her behavior is normal. Thought content normal.   Nursing note and vitals reviewed.      Procedures           ED Course  ED Course as of Aug 05 0845   Mon Aug 05, 2019   0749 Signed out to Jania Mayfield PA-C  [MM]   0896 Patient found to have UTI.  Will give Rocephin in the ED and then will be discharged home on antibiotics and she will follow up outpatient.  [AH]      ED Course User Index  [AH] Jania Mayfield PA  [MM] Clementina Adam PA                  [facility]  Number of Diagnoses or Management Options  Bacterial UTI:      Amount and/or Complexity of Data Reviewed  Clinical lab tests: reviewed    Patient Progress  Patient progress: improved        Final diagnoses:   Bacterial UTI            aJnia Mayfield PA  08/05/19 0854

## 2019-08-05 NOTE — ED NOTES
Patient is having back pain on her left side radiating upward towards her neck. Pain has been with the patient for the last 3 days. Patient has a history of kidney stones. Patient is having urgency with urination. Patient is alert and oriented x4.      Sonny Martins RN  08/05/19 0735

## 2019-08-07 LAB — BACTERIA SPEC AEROBE CULT: ABNORMAL

## 2019-08-10 LAB
BACTERIA SPEC AEROBE CULT: NORMAL
BACTERIA SPEC AEROBE CULT: NORMAL

## 2019-09-18 ENCOUNTER — HOSPITAL ENCOUNTER (EMERGENCY)
Facility: HOSPITAL | Age: 39
Discharge: HOME | End: 2019-09-18
Attending: EMERGENCY MEDICINE | Admitting: EMERGENCY MEDICINE

## 2019-09-18 VITALS
DIASTOLIC BLOOD PRESSURE: 91 MMHG | HEIGHT: 65 IN | BODY MASS INDEX: 26.66 KG/M2 | HEART RATE: 112 BPM | WEIGHT: 160 LBS | SYSTOLIC BLOOD PRESSURE: 138 MMHG | OXYGEN SATURATION: 98 % | TEMPERATURE: 100.6 F | RESPIRATION RATE: 18 BRPM

## 2019-09-18 DIAGNOSIS — R50.9 FEVER CHILLS: ICD-10-CM

## 2019-09-18 DIAGNOSIS — K04.7 DENTAL ABSCESS: Primary | ICD-10-CM

## 2019-09-18 PROCEDURE — 99283 EMERGENCY DEPT VISIT LOW MDM: CPT

## 2019-09-18 PROCEDURE — 25010000002 CEFTRIAXONE PER 250 MG: Performed by: PHYSICIAN ASSISTANT

## 2019-09-18 PROCEDURE — 96372 THER/PROPH/DIAG INJ SC/IM: CPT

## 2019-09-18 RX ORDER — CEPHALEXIN 500 MG/1
500 CAPSULE ORAL 2 TIMES DAILY
Qty: 20 CAPSULE | Refills: 0 | OUTPATIENT
Start: 2019-09-18 | End: 2019-10-04 | Stop reason: HOSPADM

## 2019-09-18 RX ORDER — IBUPROFEN 400 MG/1
800 TABLET ORAL ONCE
Status: COMPLETED | OUTPATIENT
Start: 2019-09-18 | End: 2019-09-18

## 2019-09-18 RX ORDER — LIDOCAINE HYDROCHLORIDE 20 MG/ML
5 SOLUTION OROPHARYNGEAL
Status: DISCONTINUED | OUTPATIENT
Start: 2019-09-18 | End: 2019-09-18 | Stop reason: HOSPADM

## 2019-09-18 RX ORDER — CEFTRIAXONE 1 G/1
1000 INJECTION, POWDER, FOR SOLUTION INTRAMUSCULAR; INTRAVENOUS ONCE
Status: COMPLETED | OUTPATIENT
Start: 2019-09-18 | End: 2019-09-18

## 2019-09-18 RX ORDER — INDOMETHACIN 25 MG/1
50 CAPSULE ORAL 3 TIMES DAILY PRN
Qty: 15 CAPSULE | Refills: 0 | OUTPATIENT
Start: 2019-09-18 | End: 2019-10-04 | Stop reason: HOSPADM

## 2019-09-18 RX ORDER — LIDOCAINE HYDROCHLORIDE 10 MG/ML
2.1 INJECTION, SOLUTION EPIDURAL; INFILTRATION; INTRACAUDAL; PERINEURAL ONCE
Status: COMPLETED | OUTPATIENT
Start: 2019-09-18 | End: 2019-09-18

## 2019-09-18 RX ORDER — ACETAMINOPHEN AND CODEINE PHOSPHATE 300; 30 MG/1; MG/1
1 TABLET ORAL EVERY 4 HOURS PRN
Qty: 12 TABLET | Refills: 0 | OUTPATIENT
Start: 2019-09-18 | End: 2019-10-04 | Stop reason: HOSPADM

## 2019-09-18 RX ADMIN — IBUPROFEN 800 MG: 400 TABLET, FILM COATED ORAL at 18:41

## 2019-09-18 RX ADMIN — CEFTRIAXONE 1000 MG: 1 INJECTION, POWDER, FOR SOLUTION INTRAMUSCULAR; INTRAVENOUS at 18:40

## 2019-09-18 RX ADMIN — LIDOCAINE HYDROCHLORIDE 2.1 ML: 10 INJECTION, SOLUTION EPIDURAL; INFILTRATION; INTRACAUDAL; PERINEURAL at 18:40

## 2019-09-18 RX ADMIN — BENZOCAINE 1 ML: 200 LIQUID DENTAL; ORAL; PERIODONTAL at 19:04

## 2019-09-18 RX ADMIN — LIDOCAINE HYDROCHLORIDE 5 ML: 20 SOLUTION ORAL; TOPICAL at 18:59

## 2019-09-18 NOTE — ED PROVIDER NOTES
Subjective   39-year-old female who comes in with chief complaint right lower jaw pain and swelling.  Patient states she has had a dental abscess.  Patient reports she has developed low-grade fever earlier this day.  Denies any headache, neck stiffness, nausea, vomiting.        History provided by:  Patient   used: No    Dental Pain   Location:  Lower  Lower teeth location:  30/RL 1st molar and 31/RL 2nd molar  Quality:  Aching  Severity:  Moderate  Onset quality:  Sudden  Duration:  1 day  Timing:  Intermittent  Progression:  Worsening  Chronicity:  New  Context: abscess and dental caries    Relieved by:  Nothing  Worsened by:  Nothing  Ineffective treatments:  None tried  Associated symptoms: no congestion, no difficulty swallowing, no drooling, no facial pain, no fever, no gum swelling, no headaches, no neck swelling and no oral lesions    Risk factors: no alcohol problem, no cancer, no diabetes, no immunosuppression and no periodontal disease        Review of Systems   Constitutional: Negative for fever.   HENT: Positive for dental problem. Negative for congestion, drooling and mouth sores.    Respiratory: Negative.    Cardiovascular: Negative.  Negative for chest pain and leg swelling.   Gastrointestinal: Negative.  Negative for abdominal distention, abdominal pain and anal bleeding.   Genitourinary: Negative.  Negative for difficulty urinating, dyspareunia, dysuria and enuresis.   Musculoskeletal: Negative.  Negative for arthralgias, gait problem and joint swelling.   Skin: Negative.  Negative for color change, pallor and rash.   Neurological: Negative for headaches.   All other systems reviewed and are negative.      Past Medical History:   Diagnosis Date   • Hepatitis C virus        No Known Allergies    No past surgical history on file.    No family history on file.    Social History     Socioeconomic History   • Marital status: Single     Spouse name: Not on file   • Number of children:  Not on file   • Years of education: Not on file   • Highest education level: Not on file   Tobacco Use   • Smoking status: Current Every Day Smoker     Packs/day: 0.25     Years: 10.00     Pack years: 2.50     Types: Cigarettes   • Smokeless tobacco: Never Used   Substance and Sexual Activity   • Drug use: Yes     Types: IV           Objective   Physical Exam   Constitutional: She is oriented to person, place, and time. She appears well-developed and well-nourished. No distress.   HENT:   Head: Normocephalic and atraumatic.   Right Ear: External ear normal.   Left Ear: External ear normal.   Nose: Nose normal.   Mouth/Throat: Oropharynx is clear and moist. Dental abscesses and dental caries present. No oropharyngeal exudate.   Right jaw swelling.    Eyes: Conjunctivae and EOM are normal. Pupils are equal, round, and reactive to light. Right eye exhibits no discharge. Left eye exhibits no discharge. No scleral icterus.   Neck: Normal range of motion. Neck supple. No JVD present. No tracheal deviation present. No thyromegaly present.   Cardiovascular: Normal rate, regular rhythm, normal heart sounds and intact distal pulses. Exam reveals no gallop and no friction rub.   No murmur heard.  Pulmonary/Chest: Effort normal and breath sounds normal. No stridor. No respiratory distress. She has no wheezes. She has no rales. She exhibits no tenderness.   Abdominal: Bowel sounds are normal. She exhibits no distension and no mass. There is no tenderness. There is no rebound and no guarding.   Musculoskeletal: Normal range of motion. She exhibits no edema, tenderness or deformity.   Lymphadenopathy:     She has no cervical adenopathy.   Neurological: She is alert and oriented to person, place, and time. She displays normal reflexes. No cranial nerve deficit or sensory deficit. She exhibits normal muscle tone. Coordination normal.   Skin: Skin is warm and dry. Capillary refill takes less than 2 seconds. No rash noted. She is not  diaphoretic. No erythema. No pallor.   Psychiatric: She has a normal mood and affect. Her behavior is normal. Judgment and thought content normal.   Nursing note and vitals reviewed.      Procedures           ED Course  ED Course as of Sep 18 1830   Wed Sep 18, 2019   1826 39-year-old female comes in with right jaw swelling.  Patient did have low-grade fever.  Patient advised to take clindamycin which she was prescribed earlier this day.  Advised return if worsening swelling continues.  [BH]      ED Course User Index  [BH] Jalen Burgess PA-C                  [facility]    Final diagnoses:   Dental abscess   Fever chills              Jalen Burgess PA-C  09/18/19 1830

## 2019-10-04 ENCOUNTER — HOSPITAL ENCOUNTER (EMERGENCY)
Facility: HOSPITAL | Age: 39
Discharge: HOME | End: 2019-10-04
Attending: FAMILY MEDICINE | Admitting: FAMILY MEDICINE

## 2019-10-04 ENCOUNTER — APPOINTMENT (OUTPATIENT)
Dept: CT IMAGING | Facility: HOSPITAL | Age: 39
End: 2019-10-04

## 2019-10-04 VITALS
HEART RATE: 82 BPM | SYSTOLIC BLOOD PRESSURE: 119 MMHG | TEMPERATURE: 98.5 F | RESPIRATION RATE: 18 BRPM | OXYGEN SATURATION: 98 % | BODY MASS INDEX: 27.49 KG/M2 | DIASTOLIC BLOOD PRESSURE: 69 MMHG | HEIGHT: 65 IN | WEIGHT: 165 LBS

## 2019-10-04 DIAGNOSIS — K02.9 DENTAL CARIES: Primary | ICD-10-CM

## 2019-10-04 DIAGNOSIS — K04.7 DENTAL ABSCESS: ICD-10-CM

## 2019-10-04 LAB
ALBUMIN SERPL-MCNC: 3.92 G/DL (ref 3.5–5.2)
ALBUMIN/GLOB SERPL: 1 G/DL
ALP SERPL-CCNC: 137 U/L (ref 39–117)
ALT SERPL W P-5'-P-CCNC: 27 U/L (ref 1–33)
ANION GAP SERPL CALCULATED.3IONS-SCNC: 12.7 MMOL/L (ref 5–15)
AST SERPL-CCNC: 33 U/L (ref 1–32)
BACTERIA UR QL AUTO: ABNORMAL /HPF
BASOPHILS # BLD AUTO: 0.01 10*3/MM3 (ref 0–0.2)
BASOPHILS NFR BLD AUTO: 0.1 % (ref 0–1.5)
BILIRUB SERPL-MCNC: 0.4 MG/DL (ref 0.2–1.2)
BILIRUB UR QL STRIP: NEGATIVE
BUN BLD-MCNC: 16 MG/DL (ref 6–20)
BUN/CREAT SERPL: 21.9 (ref 7–25)
CALCIUM SPEC-SCNC: 9.1 MG/DL (ref 8.6–10.5)
CHLORIDE SERPL-SCNC: 98 MMOL/L (ref 98–107)
CLARITY UR: CLEAR
CO2 SERPL-SCNC: 26.3 MMOL/L (ref 22–29)
COLOR UR: YELLOW
CREAT BLD-MCNC: 0.73 MG/DL (ref 0.57–1)
CRP SERPL-MCNC: 1.2 MG/DL (ref 0–0.5)
D-LACTATE SERPL-SCNC: 1.9 MMOL/L (ref 0.5–2)
DEPRECATED RDW RBC AUTO: 52.6 FL (ref 37–54)
EOSINOPHIL # BLD AUTO: 0.04 10*3/MM3 (ref 0–0.4)
EOSINOPHIL NFR BLD AUTO: 0.4 % (ref 0.3–6.2)
ERYTHROCYTE [DISTWIDTH] IN BLOOD BY AUTOMATED COUNT: 14.2 % (ref 12.3–15.4)
ERYTHROCYTE [SEDIMENTATION RATE] IN BLOOD: 13 MM/HR (ref 0–20)
GFR SERPL CREATININE-BSD FRML MDRD: 90 ML/MIN/1.73
GLOBULIN UR ELPH-MCNC: 3.8 GM/DL
GLUCOSE BLD-MCNC: 106 MG/DL (ref 65–99)
GLUCOSE UR STRIP-MCNC: NEGATIVE MG/DL
HCG SERPL QL: NEGATIVE
HCT VFR BLD AUTO: 40 % (ref 34–46.6)
HGB BLD-MCNC: 12.5 G/DL (ref 12–15.9)
HGB UR QL STRIP.AUTO: ABNORMAL
HOLD SPECIMEN: NORMAL
HOLD SPECIMEN: NORMAL
HYALINE CASTS UR QL AUTO: ABNORMAL /LPF
IMM GRANULOCYTES # BLD AUTO: 0.02 10*3/MM3 (ref 0–0.05)
IMM GRANULOCYTES NFR BLD AUTO: 0.2 % (ref 0–0.5)
KETONES UR QL STRIP: NEGATIVE
LEUKOCYTE ESTERASE UR QL STRIP.AUTO: NEGATIVE
LYMPHOCYTES # BLD AUTO: 0.84 10*3/MM3 (ref 0.7–3.1)
LYMPHOCYTES NFR BLD AUTO: 9.3 % (ref 19.6–45.3)
MCH RBC QN AUTO: 31.9 PG (ref 26.6–33)
MCHC RBC AUTO-ENTMCNC: 31.3 G/DL (ref 31.5–35.7)
MCV RBC AUTO: 102 FL (ref 79–97)
MONOCYTES # BLD AUTO: 0.54 10*3/MM3 (ref 0.1–0.9)
MONOCYTES NFR BLD AUTO: 6 % (ref 5–12)
NEUTROPHILS # BLD AUTO: 7.56 10*3/MM3 (ref 1.7–7)
NEUTROPHILS NFR BLD AUTO: 84 % (ref 42.7–76)
NITRITE UR QL STRIP: NEGATIVE
PH UR STRIP.AUTO: 8.5 [PH] (ref 5–8)
PLATELET # BLD AUTO: 287 10*3/MM3 (ref 140–450)
PMV BLD AUTO: 9.7 FL (ref 6–12)
POTASSIUM BLD-SCNC: 4.5 MMOL/L (ref 3.5–5.2)
PROT SERPL-MCNC: 7.7 G/DL (ref 6–8.5)
PROT UR QL STRIP: NEGATIVE
RBC # BLD AUTO: 3.92 10*6/MM3 (ref 3.77–5.28)
RBC # UR: ABNORMAL /HPF
REF LAB TEST METHOD: ABNORMAL
SODIUM BLD-SCNC: 137 MMOL/L (ref 136–145)
SP GR UR STRIP: 1.02 (ref 1–1.03)
SQUAMOUS #/AREA URNS HPF: ABNORMAL /HPF
UROBILINOGEN UR QL STRIP: ABNORMAL
WBC NRBC COR # BLD: 9.01 10*3/MM3 (ref 3.4–10.8)
WBC UR QL AUTO: ABNORMAL /HPF
WHOLE BLOOD HOLD SPECIMEN: NORMAL
WHOLE BLOOD HOLD SPECIMEN: NORMAL

## 2019-10-04 PROCEDURE — 80053 COMPREHEN METABOLIC PANEL: CPT | Performed by: FAMILY MEDICINE

## 2019-10-04 PROCEDURE — 99284 EMERGENCY DEPT VISIT MOD MDM: CPT

## 2019-10-04 PROCEDURE — 70487 CT MAXILLOFACIAL W/DYE: CPT

## 2019-10-04 PROCEDURE — 81001 URINALYSIS AUTO W/SCOPE: CPT | Performed by: FAMILY MEDICINE

## 2019-10-04 PROCEDURE — 83605 ASSAY OF LACTIC ACID: CPT | Performed by: FAMILY MEDICINE

## 2019-10-04 PROCEDURE — 84145 PROCALCITONIN (PCT): CPT | Performed by: FAMILY MEDICINE

## 2019-10-04 PROCEDURE — 86140 C-REACTIVE PROTEIN: CPT | Performed by: FAMILY MEDICINE

## 2019-10-04 PROCEDURE — 87040 BLOOD CULTURE FOR BACTERIA: CPT | Performed by: FAMILY MEDICINE

## 2019-10-04 PROCEDURE — 85652 RBC SED RATE AUTOMATED: CPT | Performed by: FAMILY MEDICINE

## 2019-10-04 PROCEDURE — 85025 COMPLETE CBC W/AUTO DIFF WBC: CPT | Performed by: FAMILY MEDICINE

## 2019-10-04 PROCEDURE — 0 IOVERSOL 68 % SOLUTION: Performed by: FAMILY MEDICINE

## 2019-10-04 PROCEDURE — 84703 CHORIONIC GONADOTROPIN ASSAY: CPT | Performed by: FAMILY MEDICINE

## 2019-10-04 PROCEDURE — 70487 CT MAXILLOFACIAL W/DYE: CPT | Performed by: RADIOLOGY

## 2019-10-04 RX ORDER — PAROXETINE HYDROCHLORIDE 20 MG/1
20 TABLET, FILM COATED ORAL EVERY MORNING
COMMUNITY
End: 2022-05-05

## 2019-10-04 RX ORDER — SODIUM CHLORIDE 0.9 % (FLUSH) 0.9 %
10 SYRINGE (ML) INJECTION AS NEEDED
Status: DISCONTINUED | OUTPATIENT
Start: 2019-10-04 | End: 2019-10-04 | Stop reason: HOSPADM

## 2019-10-04 RX ORDER — AMOXICILLIN AND CLAVULANATE POTASSIUM 875; 125 MG/1; MG/1
1 TABLET, FILM COATED ORAL EVERY 12 HOURS
Qty: 20 TABLET | Refills: 0 | Status: SHIPPED | OUTPATIENT
Start: 2019-10-04 | End: 2022-05-05

## 2019-10-04 RX ORDER — ACETAMINOPHEN 325 MG/1
650 TABLET ORAL ONCE
Status: COMPLETED | OUTPATIENT
Start: 2019-10-04 | End: 2019-10-04

## 2019-10-04 RX ORDER — ACETAMINOPHEN 325 MG/1
TABLET ORAL
Status: DISCONTINUED
Start: 2019-10-04 | End: 2019-10-04 | Stop reason: HOSPADM

## 2019-10-04 RX ORDER — MAGNESIUM GLUCONATE 27 MG(500)
500 TABLET ORAL 2 TIMES DAILY
COMMUNITY
End: 2019-10-04 | Stop reason: HOSPADM

## 2019-10-04 RX ADMIN — IOVERSOL 90 ML: 678 INJECTION INTRA-ARTERIAL; INTRAVENOUS at 20:03

## 2019-10-04 RX ADMIN — ACETAMINOPHEN 650 MG: 325 TABLET ORAL at 18:07

## 2019-10-04 NOTE — ED PROVIDER NOTES
Subjective   39-year-old female presents the emergency room with complaints of dental infection over the past month.  Patient reports that she has had ongoing dental infection to her right lower jaw for the past month.  She states she is been on Keflex, amoxicillin, clindamycin her symptoms continue to recur.  She states she was seen by a dentist yesterday and was told that nothing could be done.  She states that her pain currently is controlled.  She states that she is had nausea but no vomiting she denies chest pain shortness of breath she denies difficulty swallowing she denies changes in voice.  She denies headache neck pain earache.  She states that she feels that pain becomes worse when swelling starts to decrease.  She states that she has noted some swelling to her right lower jaw.  She denies neck swelling.        Dental Pain   Location:  Lower  Lower teeth location:  31/RL 2nd molar  Quality:  Aching and throbbing  Timing:  Intermittent  Progression:  Waxing and waning  Chronicity:  Recurrent  Context: abscess    Previous work-up:  Dental exam  Relieved by:  Nothing  Worsened by:  Nothing  Associated symptoms: facial pain, facial swelling and fever    Associated symptoms: no congestion, no difficulty swallowing, no drooling, no gum swelling, no headaches, no neck pain, no neck swelling, no oral bleeding, no oral lesions and no trismus        Review of Systems   Constitutional: Positive for fever.   HENT: Positive for facial swelling. Negative for congestion, drooling and mouth sores.    Respiratory: Negative for cough, shortness of breath and wheezing.    Gastrointestinal: Negative for diarrhea, nausea and vomiting.   Musculoskeletal: Negative for back pain and neck pain.   Skin: Negative for rash and wound.   Neurological: Negative for weakness and headaches.   All other systems reviewed and are negative.      Past Medical History:   Diagnosis Date   • Depression    • Hepatitis C virus        No Known  Allergies    Past Surgical History:   Procedure Laterality Date   •  SECTION         History reviewed. No pertinent family history.    Social History     Socioeconomic History   • Marital status: Single     Spouse name: Not on file   • Number of children: Not on file   • Years of education: Not on file   • Highest education level: Not on file   Tobacco Use   • Smoking status: Current Every Day Smoker     Packs/day: 1.00     Years: 10.00     Pack years: 10.00     Types: Cigarettes   • Smokeless tobacco: Never Used   Substance and Sexual Activity   • Alcohol use: Yes     Alcohol/week: 3.6 oz     Types: 6 Shots of liquor per week     Frequency: Never   • Drug use: No   • Sexual activity: Defer           Objective   Physical Exam   Constitutional: She is oriented to person, place, and time. No distress.   HENT:   Mouth/Throat: Uvula is midline, oropharynx is clear and moist and mucous membranes are normal.       No hoarseness.  Right lower jaw tenderness palpation.  Mild swelling.  Uvula midline.  No drooling   Eyes: Conjunctivae and EOM are normal. Pupils are equal, round, and reactive to light.   Neck: Neck supple. No JVD present.   Cardiovascular:   Tachycardic.  No extra systoles.  No murmur appreciated.  2+ radial pulses   Pulmonary/Chest: Effort normal and breath sounds normal. She has no wheezes. She has no rales.   No accessory muscle use   Abdominal: Soft. Bowel sounds are normal. There is no tenderness. There is no rebound and no guarding.   Musculoskeletal: Normal range of motion. She exhibits no edema or tenderness.   Lymphadenopathy:     She has no cervical adenopathy.   Neurological: She is alert and oriented to person, place, and time. No cranial nerve deficit or sensory deficit.   Skin: Skin is warm and dry.   Psychiatric: She has a normal mood and affect.   Nursing note and vitals reviewed.      Procedures           ED Course  ED Course as of Oct 04 2039   Fri Oct 04, 2019   1809 Patient with  fever 102.7 have ordered oral Tylenol.  [BB]   1810 Patient white blood cell count unremarkable.  [BB]   2024 Patient CT scan of the face shows dental caries with a small periapical abscess.  Patient tolerating oral secretions.  Patient is afebrile after Tylenol patient is nontoxic-appearing patient is tolerating oral secretions.  Patient case discussed with Dr. Moralez at  [facility] who states patient be placed on Augmentin twice a day for 10 days.  Patient is to follow-up as an outpatient setting for likely extraction versus root canal.  Have discussed warning signs symptoms with patient reports return to the emergency room.  Patient verbalized understanding  [BB]      ED Course User Index  [BB] Sam Cao MD                  [facility]  Number of Diagnoses or Management Options  Dental abscess: new and requires workup  Dental caries: new and requires workup     Amount and/or Complexity of Data Reviewed  Clinical lab tests: reviewed and ordered  Tests in the radiology section of CPT®: reviewed and ordered  Discuss the patient with other providers: yes    Risk of Complications, Morbidity, and/or Mortality  Presenting problems: moderate  Diagnostic procedures: moderate  Management options: moderate    Patient Progress  Patient progress: stable      Final diagnoses:   Dental caries   Dental abscess              Sam Cao MD  10/04/19 2039

## 2019-10-05 LAB — PROCALCITONIN SERPL-MCNC: 0.36 NG/ML (ref 0.1–0.25)

## 2019-10-07 ENCOUNTER — TELEPHONE (OUTPATIENT)
Dept: EMERGENCY DEPT | Facility: HOSPITAL | Age: 39
End: 2019-10-07

## 2019-10-09 LAB
BACTERIA SPEC AEROBE CULT: NORMAL
BACTERIA SPEC AEROBE CULT: NORMAL

## 2022-05-05 ENCOUNTER — OFFICE VISIT (OUTPATIENT)
Dept: INTERNAL MEDICINE | Facility: CLINIC | Age: 42
End: 2022-05-05

## 2022-05-05 ENCOUNTER — LAB (OUTPATIENT)
Dept: LAB | Facility: HOSPITAL | Age: 42
End: 2022-05-05

## 2022-05-05 VITALS
WEIGHT: 195 LBS | HEIGHT: 66 IN | OXYGEN SATURATION: 100 % | TEMPERATURE: 98.4 F | RESPIRATION RATE: 16 BRPM | HEART RATE: 72 BPM | BODY MASS INDEX: 31.34 KG/M2 | DIASTOLIC BLOOD PRESSURE: 78 MMHG | SYSTOLIC BLOOD PRESSURE: 126 MMHG

## 2022-05-05 DIAGNOSIS — Z13.0 SCREENING FOR BLOOD DISEASE: ICD-10-CM

## 2022-05-05 DIAGNOSIS — Z13.1 SCREENING FOR DIABETES MELLITUS: ICD-10-CM

## 2022-05-05 DIAGNOSIS — R20.2 NUMBNESS AND TINGLING IN BOTH HANDS: ICD-10-CM

## 2022-05-05 DIAGNOSIS — G25.81 RESTLESS LEG: ICD-10-CM

## 2022-05-05 DIAGNOSIS — M79.89 LEG SWELLING: ICD-10-CM

## 2022-05-05 DIAGNOSIS — Z13.220 LIPID SCREENING: ICD-10-CM

## 2022-05-05 DIAGNOSIS — Z13.21 ENCOUNTER FOR VITAMIN DEFICIENCY SCREENING: ICD-10-CM

## 2022-05-05 DIAGNOSIS — B18.2 CHRONIC HEPATITIS C WITHOUT HEPATIC COMA: ICD-10-CM

## 2022-05-05 DIAGNOSIS — F41.8 DEPRESSION WITH ANXIETY: Primary | ICD-10-CM

## 2022-05-05 DIAGNOSIS — R20.0 NUMBNESS AND TINGLING IN BOTH HANDS: ICD-10-CM

## 2022-05-05 DIAGNOSIS — Z13.29 THYROID DISORDER SCREENING: ICD-10-CM

## 2022-05-05 LAB
ALBUMIN SERPL-MCNC: 4.3 G/DL (ref 3.5–5.2)
ALBUMIN/GLOB SERPL: 1.6 G/DL
ALP SERPL-CCNC: 82 U/L (ref 39–117)
ALT SERPL W P-5'-P-CCNC: 20 U/L (ref 1–33)
ANION GAP SERPL CALCULATED.3IONS-SCNC: 10.2 MMOL/L (ref 5–15)
AST SERPL-CCNC: 24 U/L (ref 1–32)
BILIRUB SERPL-MCNC: 0.3 MG/DL (ref 0–1.2)
BUN SERPL-MCNC: 15 MG/DL (ref 6–20)
BUN/CREAT SERPL: 18.5 (ref 7–25)
CALCIUM SPEC-SCNC: 9.3 MG/DL (ref 8.6–10.5)
CHLORIDE SERPL-SCNC: 106 MMOL/L (ref 98–107)
CHOLEST SERPL-MCNC: 168 MG/DL (ref 0–200)
CO2 SERPL-SCNC: 22.8 MMOL/L (ref 22–29)
CREAT SERPL-MCNC: 0.81 MG/DL (ref 0.57–1)
DEPRECATED RDW RBC AUTO: 41.3 FL (ref 37–54)
EGFRCR SERPLBLD CKD-EPI 2021: 94.8 ML/MIN/1.73
ERYTHROCYTE [DISTWIDTH] IN BLOOD BY AUTOMATED COUNT: 11.8 % (ref 12.3–15.4)
FOLATE SERPL-MCNC: 15.2 NG/ML (ref 4.78–24.2)
GLOBULIN UR ELPH-MCNC: 2.7 GM/DL
GLUCOSE SERPL-MCNC: 79 MG/DL (ref 65–99)
HBA1C MFR BLD: 4.9 % (ref 4.8–5.6)
HCT VFR BLD AUTO: 36.6 % (ref 34–46.6)
HDLC SERPL-MCNC: 55 MG/DL (ref 40–60)
HGB BLD-MCNC: 12 G/DL (ref 12–15.9)
LDLC SERPL CALC-MCNC: 97 MG/DL (ref 0–100)
LDLC/HDLC SERPL: 1.75 {RATIO}
MCH RBC QN AUTO: 31.4 PG (ref 26.6–33)
MCHC RBC AUTO-ENTMCNC: 32.8 G/DL (ref 31.5–35.7)
MCV RBC AUTO: 95.8 FL (ref 79–97)
PLATELET # BLD AUTO: 252 10*3/MM3 (ref 140–450)
PMV BLD AUTO: 10.9 FL (ref 6–12)
POTASSIUM SERPL-SCNC: 4.5 MMOL/L (ref 3.5–5.2)
PROT SERPL-MCNC: 7 G/DL (ref 6–8.5)
RBC # BLD AUTO: 3.82 10*6/MM3 (ref 3.77–5.28)
SODIUM SERPL-SCNC: 139 MMOL/L (ref 136–145)
TRIGL SERPL-MCNC: 85 MG/DL (ref 0–150)
TSH SERPL DL<=0.05 MIU/L-ACNC: 1.33 UIU/ML (ref 0.27–4.2)
VIT B12 BLD-MCNC: 556 PG/ML (ref 211–946)
VLDLC SERPL-MCNC: 16 MG/DL (ref 5–40)
WBC NRBC COR # BLD: 4.81 10*3/MM3 (ref 3.4–10.8)

## 2022-05-05 PROCEDURE — 87522 HEPATITIS C REVRS TRNSCRPJ: CPT | Performed by: NURSE PRACTITIONER

## 2022-05-05 PROCEDURE — 80061 LIPID PANEL: CPT | Performed by: NURSE PRACTITIONER

## 2022-05-05 PROCEDURE — 99204 OFFICE O/P NEW MOD 45 MIN: CPT | Performed by: NURSE PRACTITIONER

## 2022-05-05 PROCEDURE — 82746 ASSAY OF FOLIC ACID SERUM: CPT | Performed by: NURSE PRACTITIONER

## 2022-05-05 PROCEDURE — 85027 COMPLETE CBC AUTOMATED: CPT | Performed by: NURSE PRACTITIONER

## 2022-05-05 PROCEDURE — 83036 HEMOGLOBIN GLYCOSYLATED A1C: CPT | Performed by: NURSE PRACTITIONER

## 2022-05-05 PROCEDURE — 84443 ASSAY THYROID STIM HORMONE: CPT | Performed by: NURSE PRACTITIONER

## 2022-05-05 PROCEDURE — 80053 COMPREHEN METABOLIC PANEL: CPT | Performed by: NURSE PRACTITIONER

## 2022-05-05 PROCEDURE — 86803 HEPATITIS C AB TEST: CPT | Performed by: NURSE PRACTITIONER

## 2022-05-05 PROCEDURE — 82306 VITAMIN D 25 HYDROXY: CPT | Performed by: NURSE PRACTITIONER

## 2022-05-05 PROCEDURE — 82607 VITAMIN B-12: CPT | Performed by: NURSE PRACTITIONER

## 2022-05-05 RX ORDER — ROPINIROLE 0.5 MG/1
0.5 TABLET, FILM COATED ORAL NIGHTLY
Qty: 30 TABLET | Refills: 1 | Status: SHIPPED | OUTPATIENT
Start: 2022-05-05 | End: 2022-07-13

## 2022-05-05 RX ORDER — ETONOGESTREL AND ETHINYL ESTRADIOL 11.7; 2.7 MG/1; MG/1
INSERT, EXTENDED RELEASE VAGINAL
COMMUNITY
End: 2022-05-05

## 2022-05-05 RX ORDER — NICOTINE 21 MG/24HR
PATCH, TRANSDERMAL 24 HOURS TRANSDERMAL
COMMUNITY
End: 2022-05-05

## 2022-05-05 RX ORDER — HYDROCHLOROTHIAZIDE 12.5 MG/1
12.5 TABLET ORAL DAILY
Qty: 30 TABLET | Refills: 5 | Status: SHIPPED | OUTPATIENT
Start: 2022-05-05 | End: 2023-01-04

## 2022-05-05 RX ORDER — BUPROPION HYDROCHLORIDE 150 MG/1
150 TABLET ORAL DAILY
Qty: 30 TABLET | Refills: 1 | Status: SHIPPED | OUTPATIENT
Start: 2022-05-05 | End: 2022-06-09

## 2022-05-05 NOTE — PROGRESS NOTES
Subjective   Chief Complaint   Patient presents with   • Establish Care          • Leg Swelling     bilateral      Emely Gamble is a 41 y.o. female.   ***DAXHPI  The patient is here today for ***.    I have reviewed the following portions of the patient's history and confirmed they are accurate: allergies, current medications, past family history, past medical history, past social history, past surgical history, and problem list    I have personally completed the patient's review of systems.    Review of Systems   Constitutional: Negative for activity change, appetite change, chills, diaphoresis, fatigue, fever, unexpected weight gain and unexpected weight loss.   HENT: Negative for ear discharge, ear pain, mouth sores, nosebleeds, sinus pressure, sneezing and sore throat.    Eyes: Negative for pain, discharge and itching.   Respiratory: Negative for cough, chest tightness, shortness of breath and wheezing.    Cardiovascular: Negative for chest pain, palpitations and leg swelling.   Gastrointestinal: Negative for abdominal pain, constipation, diarrhea, nausea and vomiting.   Endocrine: Negative for heat intolerance, polydipsia and polyphagia.   Genitourinary: Negative for dysuria, flank pain, frequency, hematuria and urgency.   Musculoskeletal: Negative for arthralgias, back pain, gait problem, joint swelling, myalgias, neck pain and neck stiffness.   Skin: Negative for color change, pallor and rash.   Allergic/Immunologic: Negative for immunocompromised state.   Neurological: Negative for seizures, speech difficulty, weakness and numbness.   Hematological: Negative for adenopathy.   Psychiatric/Behavioral: Negative for agitation, decreased concentration, sleep disturbance, suicidal ideas and depressed mood. The patient is not nervous/anxious.        Current Outpatient Medications on File Prior to Visit   Medication Sig   • buprenorphine-naloxone (SUBOXONE) 8-2 MG per SL tablet    • citalopram (CeleXA) 40 MG  "tablet Take 1 tablet by mouth Daily.   • hydrOXYzine pamoate (VISTARIL) 25 MG capsule Take 1 capsule by mouth 2 (Two) Times a Day As Needed.   • ibuprofen (ADVIL,MOTRIN) 800 MG tablet Take 1 tablet by mouth 3 (Three) Times a Day As Needed.   • naloxone (NARCAN) 4 MG/0.1ML nasal spray    • ondansetron ODT (ZOFRAN-ODT) 4 MG disintegrating tablet Take 1 tablet by mouth Every 6 (Six) Hours As Needed for Nausea or Vomiting.   • sertraline (ZOLOFT) 50 MG tablet Take 1 tablet by mouth Daily.   • amoxicillin-clavulanate (AUGMENTIN) 875-125 MG per tablet Take 1 tablet by mouth Every 12 (Twelve) Hours.   • busPIRone (BUSPAR) 5 MG tablet Take 5 mg by mouth 2 (Two) Times a Day.   • etonogestrel-ethinyl estradiol (NUVARING) 0.12-0.015 MG/24HR vaginal ring NuvaRing 0.12 mg-0.015 mg/24 hr vaginal   • melatonin 5 MG tablet tablet Take 1 tablet by mouth Daily.   • nicotine (NICODERM CQ) 21 MG/24HR patch nicotine 21 mg/24 hr daily transdermal patch   • PARoxetine (PAXIL) 20 MG tablet Take 20 mg by mouth Every Morning.   • venlafaxine (EFFEXOR) 37.5 MG tablet Take 37.5 mg by mouth Daily.     No current facility-administered medications on file prior to visit.       Objective   Vitals:    05/05/22 1029   BP: 126/78   Pulse: 72   Resp: 16   Temp: 98.4 °F (36.9 °C)   SpO2: 100%   Weight: 88.5 kg (195 lb)   Height: 167.6 cm (66\")     Body mass index is 31.47 kg/m².    Physical Exam  Vitals reviewed.   Constitutional:       Appearance: Normal appearance. She is well-developed.   HENT:      Head: Normocephalic and atraumatic.      Nose: Nose normal.   Eyes:      General: Lids are normal.      Conjunctiva/sclera: Conjunctivae normal.      Pupils: Pupils are equal, round, and reactive to light.   Neck:      Thyroid: No thyromegaly.      Trachea: Trachea normal.   Cardiovascular:      Rate and Rhythm: Normal rate and regular rhythm.      Heart sounds: Normal heart sounds.   Pulmonary:      Effort: Pulmonary effort is normal. No respiratory " distress.      Breath sounds: Normal breath sounds.   Skin:     General: Skin is warm and dry.   Neurological:      Mental Status: She is alert and oriented to person, place, and time.      GCS: GCS eye subscore is 4. GCS verbal subscore is 5. GCS motor subscore is 6.   Psychiatric:         Attention and Perception: Attention normal.         Mood and Affect: Mood normal.         Speech: Speech normal.         Behavior: Behavior normal. Behavior is cooperative.         Thought Content: Thought content normal.         Assessment/Plan   Problem List Items Addressed This Visit    None        ***DAXPLAN    Current Outpatient Medications:   •  buprenorphine-naloxone (SUBOXONE) 8-2 MG per SL tablet, , Disp: , Rfl:   •  citalopram (CeleXA) 40 MG tablet, Take 1 tablet by mouth Daily., Disp: , Rfl:   •  hydrOXYzine pamoate (VISTARIL) 25 MG capsule, Take 1 capsule by mouth 2 (Two) Times a Day As Needed., Disp: , Rfl:   •  ibuprofen (ADVIL,MOTRIN) 800 MG tablet, Take 1 tablet by mouth 3 (Three) Times a Day As Needed., Disp: , Rfl:   •  naloxone (NARCAN) 4 MG/0.1ML nasal spray, , Disp: , Rfl:   •  ondansetron ODT (ZOFRAN-ODT) 4 MG disintegrating tablet, Take 1 tablet by mouth Every 6 (Six) Hours As Needed for Nausea or Vomiting., Disp: 10 tablet, Rfl: 0  •  sertraline (ZOLOFT) 50 MG tablet, Take 1 tablet by mouth Daily., Disp: , Rfl:   •  amoxicillin-clavulanate (AUGMENTIN) 875-125 MG per tablet, Take 1 tablet by mouth Every 12 (Twelve) Hours., Disp: 20 tablet, Rfl: 0  •  busPIRone (BUSPAR) 5 MG tablet, Take 5 mg by mouth 2 (Two) Times a Day., Disp: , Rfl:   •  etonogestrel-ethinyl estradiol (NUVARING) 0.12-0.015 MG/24HR vaginal ring, NuvaRing 0.12 mg-0.015 mg/24 hr vaginal, Disp: , Rfl:   •  melatonin 5 MG tablet tablet, Take 1 tablet by mouth Daily., Disp: , Rfl:   •  nicotine (NICODERM CQ) 21 MG/24HR patch, nicotine 21 mg/24 hr daily transdermal patch, Disp: , Rfl:   •  PARoxetine (PAXIL) 20 MG tablet, Take 20 mg by mouth  "Every Morning., Disp: , Rfl:   •  venlafaxine (EFFEXOR) 37.5 MG tablet, Take 37.5 mg by mouth Daily., Disp: , Rfl:        Plan of care reviewed with the patient at the conclusion of today's visit.  Education was provided regarding diagnosis, management, and any prescribed or recommended OTC medications.  Patient verbalized understanding of and agreement with management plan.     No follow-ups on file.      Transcribed from ambient dictation for VON Gustafson by VON Gustafson.  05/05/22   10:49 EDT    {[facility] Provider Statement:79028::\"Patient verbalized consent to the visit recording.\"}  "

## 2022-05-05 NOTE — PROGRESS NOTES
Subjective   Chief Complaint   Patient presents with   • Establish Care          • Leg Swelling     bilateral      Emely Gamble is a 41 y.o. female here today to establish care for hand numbness, leg pain, leg swelling, depression, and anxiety. Waking up every 30 minutes at night with numbness and pain in hands. Sits up and tries to get circulation better then tried to go back to sleep. Waking up feeling stiff but this could be due to bad matress. Legs feel very restless and constant urge to move and can't get comfortable. Having lower extremity leg swelling. Wearing compression stockings that have given mild relief. Eating low salt diet and reports drinking adequate water. Depression and anxiety have been difficult to manage. Is prescribed two SSRIs, zoloft and celexa. States that [facility] was working on starting one and taking her off the other but she graduated from the program and unsure of their plan. She can't tell if these meds are helping much. She was previously on wellbutrin but they took her off this. They did genesite testing but she wasn't given results. She has requested her records but can't get anyone from there to respond to her. She is recovering for substance abuse and has been clean for 4 months this time. Has been clean for several years in the past. Feels she is doing well with recovery at this time. Has plenty of support with friends and family.     I have reviewed the following portions of the patient's history and confirmed they are accurate: allergies, current medications, past family history, past medical history, past social history, past surgical history and problem list    I have personally completed the patient's review of systems.    Review of Systems   Constitutional: Positive for fatigue. Negative for activity change, appetite change, chills, diaphoresis, fever, unexpected weight gain and unexpected weight loss.   HENT: Negative for ear discharge, ear pain, mouth sores,  nosebleeds, sinus pressure, sneezing and sore throat.    Eyes: Negative for pain, discharge and itching.   Respiratory: Negative for cough, chest tightness, shortness of breath and wheezing.    Cardiovascular: Positive for leg swelling. Negative for chest pain and palpitations.   Gastrointestinal: Negative for abdominal pain, constipation, diarrhea, nausea and vomiting.   Endocrine: Negative for heat intolerance, polydipsia and polyphagia.   Genitourinary: Negative for dysuria, flank pain, frequency, hematuria and urgency.   Musculoskeletal: Positive for arthralgias, back pain and myalgias. Negative for gait problem, joint swelling, neck pain and neck stiffness.   Skin: Negative for color change, pallor and rash.   Allergic/Immunologic: Negative for immunocompromised state.   Neurological: Positive for numbness. Negative for seizures, speech difficulty and weakness.   Hematological: Negative for adenopathy.   Psychiatric/Behavioral: Positive for decreased concentration and depressed mood. Negative for agitation, sleep disturbance and suicidal ideas. The patient is nervous/anxious.        Current Outpatient Medications on File Prior to Visit   Medication Sig   • buprenorphine-naloxone (SUBOXONE) 8-2 MG per SL tablet    • citalopram (CeleXA) 40 MG tablet Take 1 tablet by mouth Daily.   • hydrOXYzine pamoate (VISTARIL) 25 MG capsule Take 1 capsule by mouth 2 (Two) Times a Day As Needed.   • ibuprofen (ADVIL,MOTRIN) 800 MG tablet Take 1 tablet by mouth 3 (Three) Times a Day As Needed.   • naloxone (NARCAN) 4 MG/0.1ML nasal spray    • ondansetron ODT (ZOFRAN-ODT) 4 MG disintegrating tablet Take 1 tablet by mouth Every 6 (Six) Hours As Needed for Nausea or Vomiting.   • [DISCONTINUED] sertraline (ZOLOFT) 50 MG tablet Take 1 tablet by mouth Daily.   • [DISCONTINUED] amoxicillin-clavulanate (AUGMENTIN) 875-125 MG per tablet Take 1 tablet by mouth Every 12 (Twelve) Hours.   • [DISCONTINUED] busPIRone (BUSPAR) 5 MG tablet  "Take 5 mg by mouth 2 (Two) Times a Day.   • [DISCONTINUED] etonogestrel-ethinyl estradiol (NUVARING) 0.12-0.015 MG/24HR vaginal ring NuvaRing 0.12 mg-0.015 mg/24 hr vaginal   • [DISCONTINUED] melatonin 5 MG tablet tablet Take 1 tablet by mouth Daily.   • [DISCONTINUED] nicotine (NICODERM CQ) 21 MG/24HR patch nicotine 21 mg/24 hr daily transdermal patch   • [DISCONTINUED] PARoxetine (PAXIL) 20 MG tablet Take 20 mg by mouth Every Morning.   • [DISCONTINUED] venlafaxine (EFFEXOR) 37.5 MG tablet Take 37.5 mg by mouth Daily.     No current facility-administered medications on file prior to visit.       Objective   Vitals:    05/05/22 1029   BP: 126/78   Pulse: 72   Resp: 16   Temp: 98.4 °F (36.9 °C)   SpO2: 100%   Weight: 88.5 kg (195 lb)   Height: 167.6 cm (66\")     Body mass index is 31.47 kg/m².    Physical Exam  Vitals reviewed.   Constitutional:       Appearance: Normal appearance. She is well-developed.   HENT:      Head: Normocephalic and atraumatic.      Nose: Nose normal.   Eyes:      General: Lids are normal.      Conjunctiva/sclera: Conjunctivae normal.      Pupils: Pupils are equal, round, and reactive to light.   Neck:      Thyroid: No thyromegaly.      Trachea: Trachea normal.   Cardiovascular:      Rate and Rhythm: Normal rate and regular rhythm.      Heart sounds: Normal heart sounds.   Pulmonary:      Effort: Pulmonary effort is normal. No respiratory distress.      Breath sounds: Normal breath sounds.   Musculoskeletal:      Right lower leg: Edema present.      Left lower leg: Edema present.   Skin:     General: Skin is warm and dry.   Neurological:      Mental Status: She is alert and oriented to person, place, and time.      GCS: GCS eye subscore is 4. GCS verbal subscore is 5. GCS motor subscore is 6.   Psychiatric:         Attention and Perception: Attention normal.         Mood and Affect: Mood normal.         Speech: Speech normal.         Behavior: Behavior normal. Behavior is cooperative.        "  Thought Content: Thought content normal.         Assessment/Plan   Problem List Items Addressed This Visit    None     Visit Diagnoses     Depression with anxiety    -  Primary  Chronic unstable. Start wellbutrin. Continue celexa and zoloft. [facility] testing done. Will make decision at next appt on which to wean off of celexa or zoloft.       Relevant Medications    buPROPion XL (Wellbutrin XL) 150 MG 24 hr tablet    sertraline (ZOLOFT) 50 MG tablet    Restless leg      Chronic usntable. Start requip.      Relevant Medications    rOPINIRole (Requip) 0.5 MG tablet    Leg swelling      Chronic unstable. Start HCTZ.       Relevant Medications    hydroCHLOROthiazide (HYDRODIURIL) 12.5 MG tablet    Numbness and tingling in both hands  Chronic unstable. EMG ordered bilaterally.         Chronic hepatitis C without hepatic coma (HCC)        Relevant Orders    Hepatitis C Antibody    HCV RNA By PCR, Qn Rfx [place]    Screening for blood disease        Relevant Orders    CBC (No Diff)    Comprehensive Metabolic Panel    Lipid Panel    Hemoglobin A1c    TSH Rfx On Abnormal To Free T4    Vitamin B12 & Folate    Vitamin D 25 Hydroxy    Hepatitis C Antibody    Thyroid disorder screening        Relevant Orders    TSH Rfx On Abnormal To Free T4    Lipid screening        Relevant Orders    Lipid Panel    Encounter for vitamin deficiency screening        Relevant Orders    Hepatitis C Antibody    Screening for diabetes mellitus        Relevant Orders    Hemoglobin A1c             Current Outpatient Medications:   •  buprenorphine-naloxone (SUBOXONE) 8-2 MG per SL tablet, , Disp: , Rfl:   •  citalopram (CeleXA) 40 MG tablet, Take 1 tablet by mouth Daily., Disp: , Rfl:   •  hydrOXYzine pamoate (VISTARIL) 25 MG capsule, Take 1 capsule by mouth 2 (Two) Times a Day As Needed., Disp: , Rfl:   •  ibuprofen (ADVIL,MOTRIN) 800 MG tablet, Take 1 tablet by mouth 3 (Three) Times a Day As Needed., Disp: , Rfl:   •  naloxone (NARCAN) 4 MG/0.1ML  nasal spray, , Disp: , Rfl:   •  ondansetron ODT (ZOFRAN-ODT) 4 MG disintegrating tablet, Take 1 tablet by mouth Every 6 (Six) Hours As Needed for Nausea or Vomiting., Disp: 10 tablet, Rfl: 0  •  sertraline (ZOLOFT) 50 MG tablet, Take 1 tablet by mouth Daily., Disp: 30 tablet, Rfl: 1  •  buPROPion XL (Wellbutrin XL) 150 MG 24 hr tablet, Take 1 tablet by mouth Daily., Disp: 30 tablet, Rfl: 1  •  hydroCHLOROthiazide (HYDRODIURIL) 12.5 MG tablet, Take 1 tablet by mouth Daily. (FLUID PILL), Disp: 30 tablet, Rfl: 5  •  rOPINIRole (Requip) 0.5 MG tablet, Take 1 tablet by mouth Every Night. Take 1 hour before bedtime., Disp: 30 tablet, Rfl: 1       Plan of care reviewed with the patient at the conclusion of today's visit.  Education was provided regarding diagnosis, management, and any prescribed or recommended OTC medications.  Patient verbalized understanding of and agreement with management plan.     Return in about 1 month (around 6/5/2022), or if symptoms worsen or fail to improve.      VON Gustafson    Patient has given verbal consent to use [facility] to record this visit today.

## 2022-05-06 LAB
25(OH)D3 SERPL-MCNC: 25 NG/ML (ref 30–100)
HCV AB SER DONR QL: REACTIVE

## 2022-05-09 LAB
HCV GENTYP SERPL NAA+PROBE: NORMAL
HCV RNA SERPL NAA+PROBE-ACNC: NORMAL IU/ML
HCV RNA SERPL NAA+PROBE-LOG IU: NORMAL {LOG_IU}/ML
LABORATORY COMMENT REPORT: NORMAL

## 2022-06-09 ENCOUNTER — OFFICE VISIT (OUTPATIENT)
Dept: INTERNAL MEDICINE | Facility: CLINIC | Age: 42
End: 2022-06-09

## 2022-06-09 VITALS
TEMPERATURE: 98.3 F | SYSTOLIC BLOOD PRESSURE: 116 MMHG | WEIGHT: 187 LBS | DIASTOLIC BLOOD PRESSURE: 78 MMHG | RESPIRATION RATE: 16 BRPM | HEART RATE: 79 BPM | HEIGHT: 66 IN | OXYGEN SATURATION: 99 % | BODY MASS INDEX: 30.05 KG/M2

## 2022-06-09 DIAGNOSIS — F41.8 DEPRESSION WITH ANXIETY: Primary | ICD-10-CM

## 2022-06-09 DIAGNOSIS — G25.81 RESTLESS LEG: ICD-10-CM

## 2022-06-09 DIAGNOSIS — M79.89 LEG SWELLING: ICD-10-CM

## 2022-06-09 DIAGNOSIS — E55.9 VITAMIN D DEFICIENCY: ICD-10-CM

## 2022-06-09 PROCEDURE — 99214 OFFICE O/P EST MOD 30 MIN: CPT | Performed by: NURSE PRACTITIONER

## 2022-06-09 RX ORDER — ERGOCALCIFEROL 1.25 MG/1
50000 CAPSULE ORAL WEEKLY
Qty: 4 CAPSULE | Refills: 2 | Status: SHIPPED | OUTPATIENT
Start: 2022-06-09 | End: 2022-09-29

## 2022-06-09 RX ORDER — BUPROPION HYDROCHLORIDE 300 MG/1
300 TABLET ORAL DAILY
Qty: 30 TABLET | Refills: 2 | Status: SHIPPED | OUTPATIENT
Start: 2022-06-09 | End: 2022-08-18 | Stop reason: SDUPTHER

## 2022-06-09 RX ORDER — DIPHENOXYLATE HYDROCHLORIDE AND ATROPINE SULFATE 2.5; .025 MG/1; MG/1
1 TABLET ORAL DAILY
Qty: 30 TABLET | Refills: 11 | Status: SHIPPED | OUTPATIENT
Start: 2022-06-09 | End: 2023-03-10

## 2022-06-09 RX ORDER — CITALOPRAM 40 MG/1
40 TABLET ORAL DAILY
Qty: 30 TABLET | Refills: 2 | Status: SHIPPED | OUTPATIENT
Start: 2022-06-09 | End: 2022-11-15

## 2022-06-09 RX ORDER — HYDROXYZINE PAMOATE 25 MG/1
25 CAPSULE ORAL 2 TIMES DAILY PRN
Qty: 60 CAPSULE | Refills: 5 | Status: SHIPPED | OUTPATIENT
Start: 2022-06-09 | End: 2022-07-14 | Stop reason: SDUPTHER

## 2022-06-09 RX ORDER — IBUPROFEN 800 MG/1
800 TABLET ORAL 3 TIMES DAILY PRN
Qty: 90 TABLET | Refills: 5 | Status: SHIPPED | OUTPATIENT
Start: 2022-06-09

## 2022-06-09 NOTE — PROGRESS NOTES
Subjective   Chief Complaint   Patient presents with   • Depression      Emely Gamble is a 41 y.o. female.     The patient is here today for a follow-up on depression.    Depression  The patient states that she is doing well. She states that she has been following a keto diet that she initiated 2 weeks ago, and it has been going well. She states that since her last visit, in 05/2022, she has lost 8 lbs. The patient is currently taking Celexa and Zoloft, both of which she has been tolerating well. The patient inquired about the blood work she completed at her last visit. She states that due to phone issues she was not able to retrieve the results form [facility]. The patient states that she is also taking Vistaril for anxiety and she need a refill on the medication. The patient states that she is also tolerating well with taking Wellbutrin, and she need a refill on that as well.     Leg swelling  The patient reports that she has been experiencing leg swelling. She inquired about the water pill that she is taking, to see if it has been helping with swelling, because she states that she has been having minimum swelling. She also states that it may be making progression due to her consuming low carbs now. The patient reports that she prefer taking Celexa rather than Zoloft. The patient reports that she need a refill on Celexa, and she would like some ibuprofen, to help with headaches she has been experiencing due to having ketosis.     Carpal tunnel  The patient states that she has a appointment with the [facility] to address the carpal tunnel issues she is experiencing.      I have reviewed the following portions of the patient's history and confirmed they are accurate: allergies, current medications, past family history, past medical history, past social history, past surgical history, and problem list    I have personally completed the patient's review of systems.    Review of Systems   Constitutional: Positive for  fatigue. Negative for activity change, appetite change, chills, diaphoresis, fever, unexpected weight gain and unexpected weight loss.   HENT: Negative for ear discharge, ear pain, mouth sores, nosebleeds, sinus pressure, sneezing and sore throat.    Eyes: Negative for pain, discharge and itching.   Respiratory: Negative for cough, chest tightness, shortness of breath and wheezing.    Cardiovascular: Negative for chest pain, palpitations and leg swelling.   Gastrointestinal: Negative for abdominal pain, constipation, diarrhea, nausea and vomiting.   Endocrine: Negative for heat intolerance, polydipsia and polyphagia.   Genitourinary: Negative for dysuria, flank pain, frequency, hematuria and urgency.   Musculoskeletal: Positive for arthralgias, back pain and myalgias. Negative for gait problem, joint swelling, neck pain and neck stiffness.   Skin: Negative for color change, pallor and rash.   Allergic/Immunologic: Negative for immunocompromised state.   Neurological: Positive for numbness. Negative for seizures, speech difficulty and weakness.   Hematological: Negative for adenopathy.   Psychiatric/Behavioral: Positive for decreased concentration and depressed mood. Negative for agitation, sleep disturbance and suicidal ideas. The patient is nervous/anxious.        Current Outpatient Medications on File Prior to Visit   Medication Sig   • buprenorphine-naloxone (SUBOXONE) 8-2 MG per SL tablet    • hydroCHLOROthiazide (HYDRODIURIL) 12.5 MG tablet Take 1 tablet by mouth Daily. (FLUID PILL)   • naloxone (NARCAN) 4 MG/0.1ML nasal spray    • ondansetron ODT (ZOFRAN-ODT) 4 MG disintegrating tablet Take 1 tablet by mouth Every 6 (Six) Hours As Needed for Nausea or Vomiting.   • rOPINIRole (Requip) 0.5 MG tablet Take 1 tablet by mouth Every Night. Take 1 hour before bedtime.   • sertraline (ZOLOFT) 50 MG tablet Take 1 tablet by mouth Daily.   • [DISCONTINUED] buPROPion XL (Wellbutrin XL) 150 MG 24 hr tablet Take 1 tablet by  "mouth Daily.   • [DISCONTINUED] citalopram (CeleXA) 40 MG tablet Take 1 tablet by mouth Daily.   • [DISCONTINUED] hydrOXYzine pamoate (VISTARIL) 25 MG capsule Take 1 capsule by mouth 2 (Two) Times a Day As Needed.   • [DISCONTINUED] ibuprofen (ADVIL,MOTRIN) 800 MG tablet Take 1 tablet by mouth 3 (Three) Times a Day As Needed.     No current facility-administered medications on file prior to visit.       Objective   Vitals:    06/09/22 1331   BP: 116/78   Pulse: 79   Resp: 16   Temp: 98.3 °F (36.8 °C)   TempSrc: Temporal   SpO2: 99%   Weight: 84.8 kg (187 lb)   Height: 167.6 cm (66\")     Body mass index is 30.18 kg/m².    Physical Exam  Vitals reviewed.   Constitutional:       Appearance: Normal appearance. She is well-developed.   HENT:      Head: Normocephalic and atraumatic.      Nose: Nose normal.   Eyes:      General: Lids are normal.      Conjunctiva/sclera: Conjunctivae normal.      Pupils: Pupils are equal, round, and reactive to light.   Neck:      Thyroid: No thyromegaly.      Trachea: Trachea normal.   Pulmonary:      Effort: Pulmonary effort is normal. No respiratory distress.   Skin:     General: Skin is warm and dry.   Neurological:      Mental Status: She is alert and oriented to person, place, and time.      GCS: GCS eye subscore is 4. GCS verbal subscore is 5. GCS motor subscore is 6.   Psychiatric:         Attention and Perception: Attention normal.         Mood and Affect: Mood normal.         Speech: Speech normal.         Behavior: Behavior normal. Behavior is cooperative.         Assessment & Plan   Problem List Items Addressed This Visit    None     Visit Diagnoses     Depression with anxiety    -  Primary    Relevant Medications    hydrOXYzine pamoate (VISTARIL) 25 MG capsule    buPROPion XL (Wellbutrin XL) 300 MG 24 hr tablet    citalopram (CeleXA) 40 MG tablet    Restless leg        Leg swelling        Vitamin D deficiency        Relevant Medications    vitamin D (ERGOCALCIFEROL) 1.25 MG " (37242 UT) capsule capsule    multivitamin (Multiple Vitamin) tablet tablet         1. Depression and anxiety  This issue is chronic and unstable. Increase Wellbutrin. The patient will continue taking Zoloft, Celexa, and hydroxyzine. Will plan to wean the patient off zoloft at the next appointment.    2. Restless leg  This issue is chronic and stable. The patient will continue taking Requip.    3. Leg swelling   This issue is chronic and stable. The patient will continue taking hydrochlorothiazide.       Current Outpatient Medications:   •  buprenorphine-naloxone (SUBOXONE) 8-2 MG per SL tablet, , Disp: , Rfl:   •  citalopram (CeleXA) 40 MG tablet, Take 1 tablet by mouth Daily., Disp: 30 tablet, Rfl: 2  •  hydroCHLOROthiazide (HYDRODIURIL) 12.5 MG tablet, Take 1 tablet by mouth Daily. (FLUID PILL), Disp: 30 tablet, Rfl: 5  •  hydrOXYzine pamoate (VISTARIL) 25 MG capsule, Take 1 capsule by mouth 2 (Two) Times a Day As Needed for Anxiety., Disp: 60 capsule, Rfl: 5  •  ibuprofen (ADVIL,MOTRIN) 800 MG tablet, Take 1 tablet by mouth 3 (Three) Times a Day As Needed for Moderate Pain ., Disp: 90 tablet, Rfl: 5  •  naloxone (NARCAN) 4 MG/0.1ML nasal spray, , Disp: , Rfl:   •  ondansetron ODT (ZOFRAN-ODT) 4 MG disintegrating tablet, Take 1 tablet by mouth Every 6 (Six) Hours As Needed for Nausea or Vomiting., Disp: 10 tablet, Rfl: 0  •  rOPINIRole (Requip) 0.5 MG tablet, Take 1 tablet by mouth Every Night. Take 1 hour before bedtime., Disp: 30 tablet, Rfl: 1  •  sertraline (ZOLOFT) 50 MG tablet, Take 1 tablet by mouth Daily., Disp: 30 tablet, Rfl: 1  •  buPROPion XL (Wellbutrin XL) 300 MG 24 hr tablet, Take 1 tablet by mouth Daily., Disp: 30 tablet, Rfl: 2  •  multivitamin (Multiple Vitamin) tablet tablet, Take 1 tablet by mouth Daily., Disp: 30 tablet, Rfl: 11  •  vitamin D (ERGOCALCIFEROL) 1.25 MG (56708 UT) capsule capsule, Take 1 capsule by mouth 1 (One) Time Per Week., Disp: 4 capsule, Rfl: 2       Plan of care reviewed  with the patient at the conclusion of today's visit.  Education was provided regarding diagnosis, management, and any prescribed or recommended OTC medications.  Patient verbalized understanding of and agreement with management plan.     Return in about 1 month (around 7/9/2022), or if symptoms worsen or fail to improve.      Transcribed from ambient dictation for VON Gustafson by Dilcia Harvey.  06/09/22   13:58 EDT    Patient verbalized consent to the visit recording.

## 2022-06-24 ENCOUNTER — HOSPITAL ENCOUNTER (OUTPATIENT)
Dept: NEUROLOGY | Facility: HOSPITAL | Age: 42
Discharge: HOME | End: 2022-06-24
Admitting: NURSE PRACTITIONER

## 2022-06-24 DIAGNOSIS — R20.2 NUMBNESS AND TINGLING IN BOTH HANDS: ICD-10-CM

## 2022-06-24 DIAGNOSIS — R20.0 NUMBNESS AND TINGLING IN BOTH HANDS: ICD-10-CM

## 2022-06-24 PROCEDURE — 95886 MUSC TEST DONE W/N TEST COMP: CPT

## 2022-06-24 PROCEDURE — 95910 NRV CNDJ TEST 7-8 STUDIES: CPT

## 2022-07-13 DIAGNOSIS — G25.81 RESTLESS LEG: ICD-10-CM

## 2022-07-13 DIAGNOSIS — F41.8 DEPRESSION WITH ANXIETY: ICD-10-CM

## 2022-07-13 RX ORDER — ROPINIROLE 0.5 MG/1
TABLET, FILM COATED ORAL
Qty: 30 TABLET | Refills: 1 | Status: SHIPPED | OUTPATIENT
Start: 2022-07-13 | End: 2022-09-29

## 2022-07-13 RX ORDER — BUPROPION HYDROCHLORIDE 150 MG/1
TABLET ORAL
Qty: 30 TABLET | Refills: 1 | Status: SHIPPED | OUTPATIENT
Start: 2022-07-13 | End: 2022-07-14

## 2022-07-14 ENCOUNTER — OFFICE VISIT (OUTPATIENT)
Dept: INTERNAL MEDICINE | Facility: CLINIC | Age: 42
End: 2022-07-14

## 2022-07-14 VITALS
OXYGEN SATURATION: 97 % | HEIGHT: 66 IN | DIASTOLIC BLOOD PRESSURE: 80 MMHG | HEART RATE: 65 BPM | BODY MASS INDEX: 25.71 KG/M2 | WEIGHT: 160 LBS | SYSTOLIC BLOOD PRESSURE: 116 MMHG

## 2022-07-14 DIAGNOSIS — F41.8 DEPRESSION WITH ANXIETY: Primary | ICD-10-CM

## 2022-07-14 DIAGNOSIS — G47.09 OTHER INSOMNIA: ICD-10-CM

## 2022-07-14 DIAGNOSIS — G56.03 BILATERAL CARPAL TUNNEL SYNDROME: ICD-10-CM

## 2022-07-14 PROCEDURE — 99214 OFFICE O/P EST MOD 30 MIN: CPT | Performed by: NURSE PRACTITIONER

## 2022-07-14 RX ORDER — TRAZODONE HYDROCHLORIDE 50 MG/1
TABLET ORAL
Qty: 45 TABLET | Refills: 2 | Status: SHIPPED | OUTPATIENT
Start: 2022-07-14 | End: 2022-11-15

## 2022-07-14 RX ORDER — HYDROXYZINE PAMOATE 25 MG/1
25 CAPSULE ORAL 2 TIMES DAILY PRN
Qty: 60 CAPSULE | Refills: 5 | OUTPATIENT
Start: 2022-07-14 | End: 2023-02-21

## 2022-07-14 RX ORDER — SERTRALINE HYDROCHLORIDE 25 MG/1
25 TABLET, FILM COATED ORAL DAILY
Qty: 30 TABLET | Refills: 1 | Status: SHIPPED | OUTPATIENT
Start: 2022-07-14

## 2022-07-14 NOTE — PROGRESS NOTES
Subjective   Chief Complaint   Patient presents with   • Med Refill      Emely Gamble is a 41 y.o. female.     The patient is here today for medication refill.    Weight  The patient reports her weight has plateaued.  She is taking Wellbutrin 300 mg 1 pill per day.     Insomnia  The patient reports difficulty sleeping. She denies snoring.     Depression, anxiety  The patient reports her depression and anxiety is stable on Zoloft and Celexa.     Carpal tunnel  She had an EMG which showed mild to moderate carpal tunnel in bilateral wrists. She has intermittent numbness.     I have reviewed the following portions of the patient's history and confirmed they are accurate: allergies, current medications, past family history, past medical history, past social history, past surgical history, and problem list    I have personally completed the patient's review of systems.    Review of Systems   Constitutional: Positive for fatigue. Negative for activity change, appetite change, chills, diaphoresis, fever, unexpected weight gain and unexpected weight loss.   HENT: Negative for ear discharge, ear pain, mouth sores, nosebleeds, sinus pressure, sneezing and sore throat.    Eyes: Negative for pain, discharge and itching.   Respiratory: Negative for cough, chest tightness, shortness of breath and wheezing.    Cardiovascular: Negative for chest pain, palpitations and leg swelling.   Gastrointestinal: Negative for abdominal pain, constipation, diarrhea, nausea and vomiting.   Endocrine: Negative for heat intolerance, polydipsia and polyphagia.   Genitourinary: Negative for dysuria, flank pain, frequency, hematuria and urgency.   Musculoskeletal: Positive for arthralgias, back pain and myalgias. Negative for gait problem, joint swelling, neck pain and neck stiffness.   Skin: Negative for color change, pallor and rash.   Allergic/Immunologic: Negative for immunocompromised state.   Neurological: Positive for numbness. Negative  "for seizures, speech difficulty and weakness.   Hematological: Negative for adenopathy.   Psychiatric/Behavioral: Positive for sleep disturbance. Negative for agitation, decreased concentration, suicidal ideas and depressed mood. The patient is not nervous/anxious.        Current Outpatient Medications on File Prior to Visit   Medication Sig   • buprenorphine-naloxone (SUBOXONE) 8-2 MG per SL tablet    • buPROPion XL (Wellbutrin XL) 300 MG 24 hr tablet Take 1 tablet by mouth Daily.   • citalopram (CeleXA) 40 MG tablet Take 1 tablet by mouth Daily.   • hydroCHLOROthiazide (HYDRODIURIL) 12.5 MG tablet Take 1 tablet by mouth Daily. (FLUID PILL)   • ibuprofen (ADVIL,MOTRIN) 800 MG tablet Take 1 tablet by mouth 3 (Three) Times a Day As Needed for Moderate Pain .   • multivitamin (Multiple Vitamin) tablet tablet Take 1 tablet by mouth Daily.   • naloxone (NARCAN) 4 MG/0.1ML nasal spray    • ondansetron ODT (ZOFRAN-ODT) 4 MG disintegrating tablet Take 1 tablet by mouth Every 6 (Six) Hours As Needed for Nausea or Vomiting.   • rOPINIRole (REQUIP) 0.5 MG tablet TAKE ONE TABLET BY MOUTH EVERY NIGHT 1 HOUR BEFORE BEDTIME   • vitamin D (ERGOCALCIFEROL) 1.25 MG (54851 UT) capsule capsule Take 1 capsule by mouth 1 (One) Time Per Week.   • [DISCONTINUED] buPROPion XL (WELLBUTRIN XL) 150 MG 24 hr tablet TAKE ONE TABLET BY MOUTH DAILY   • [DISCONTINUED] hydrOXYzine pamoate (VISTARIL) 25 MG capsule Take 1 capsule by mouth 2 (Two) Times a Day As Needed for Anxiety.   • [DISCONTINUED] sertraline (ZOLOFT) 50 MG tablet TAKE ONE TABLET BY MOUTH DAILY     No current facility-administered medications on file prior to visit.       Objective   Vitals:    07/14/22 1315   BP: 116/80   Pulse: 65   SpO2: 97%   Weight: 72.6 kg (160 lb)   Height: 167.6 cm (66\")     Body mass index is 25.82 kg/m².    Physical Exam  Vitals reviewed.   Constitutional:       Appearance: Normal appearance. She is well-developed.   HENT:      Head: Normocephalic and " atraumatic.      Nose: Nose normal.   Eyes:      General: Lids are normal.      Conjunctiva/sclera: Conjunctivae normal.      Pupils: Pupils are equal, round, and reactive to light.   Neck:      Thyroid: No thyromegaly.      Trachea: Trachea normal.   Pulmonary:      Effort: Pulmonary effort is normal. No respiratory distress.   Skin:     General: Skin is warm and dry.   Neurological:      Mental Status: She is alert and oriented to person, place, and time.      GCS: GCS eye subscore is 4. GCS verbal subscore is 5. GCS motor subscore is 6.   Psychiatric:         Attention and Perception: Attention normal.         Mood and Affect: Mood normal.         Speech: Speech normal.         Behavior: Behavior normal. Behavior is cooperative.         Assessment & Plan   Problem List Items Addressed This Visit    None     Visit Diagnoses     Depression with anxiety    -  Primary    Relevant Medications    sertraline (Zoloft) 25 MG tablet    traZODone (DESYREL) 50 MG tablet    hydrOXYzine pamoate (VISTARIL) 25 MG capsule    Other insomnia        Relevant Medications    traZODone (DESYREL) 50 MG tablet    Bilateral carpal tunnel syndrome        Relevant Orders    Ambulatory Referral to Orthopedic Surgery (Completed)         Depression, anxiety  Chronic, stable. Decrease Zoloft down to 25 mg daily. Continue Celexa, continue Wellbutrin discussed with patient how to wean off the Zoloft.     Insomnia  Chronic, unstable. Start trazodone.    Bilateral carpal tunnel  Chronic, unstable. Continue ibuprofen as needed. Continue wrist support wraps as needed. Referring to orthopedics.    Current Outpatient Medications:   •  buprenorphine-naloxone (SUBOXONE) 8-2 MG per SL tablet, , Disp: , Rfl:   •  buPROPion XL (Wellbutrin XL) 300 MG 24 hr tablet, Take 1 tablet by mouth Daily., Disp: 30 tablet, Rfl: 2  •  citalopram (CeleXA) 40 MG tablet, Take 1 tablet by mouth Daily., Disp: 30 tablet, Rfl: 2  •  hydroCHLOROthiazide (HYDRODIURIL) 12.5 MG  tablet, Take 1 tablet by mouth Daily. (FLUID PILL), Disp: 30 tablet, Rfl: 5  •  ibuprofen (ADVIL,MOTRIN) 800 MG tablet, Take 1 tablet by mouth 3 (Three) Times a Day As Needed for Moderate Pain ., Disp: 90 tablet, Rfl: 5  •  multivitamin (Multiple Vitamin) tablet tablet, Take 1 tablet by mouth Daily., Disp: 30 tablet, Rfl: 11  •  naloxone (NARCAN) 4 MG/0.1ML nasal spray, , Disp: , Rfl:   •  ondansetron ODT (ZOFRAN-ODT) 4 MG disintegrating tablet, Take 1 tablet by mouth Every 6 (Six) Hours As Needed for Nausea or Vomiting., Disp: 10 tablet, Rfl: 0  •  rOPINIRole (REQUIP) 0.5 MG tablet, TAKE ONE TABLET BY MOUTH EVERY NIGHT 1 HOUR BEFORE BEDTIME, Disp: 30 tablet, Rfl: 1  •  vitamin D (ERGOCALCIFEROL) 1.25 MG (51736 UT) capsule capsule, Take 1 capsule by mouth 1 (One) Time Per Week., Disp: 4 capsule, Rfl: 2  •  hydrOXYzine pamoate (VISTARIL) 25 MG capsule, Take 1 capsule by mouth 2 (Two) Times a Day As Needed for Anxiety., Disp: 60 capsule, Rfl: 5  •  sertraline (Zoloft) 25 MG tablet, Take 1 tablet by mouth Daily., Disp: 30 tablet, Rfl: 1  •  traZODone (DESYREL) 50 MG tablet, Take 1-2 tablets one hour before bedtime as needed for sleep., Disp: 45 tablet, Rfl: 2       Plan of care reviewed with the patient at the conclusion of today's visit.  Education was provided regarding diagnosis, management, and any prescribed or recommended OTC medications.  Patient verbalized understanding of and agreement with management plan.     Return in about 1 month (around 8/14/2022), or if symptoms worsen or fail to improve.      Transcribed from ambient dictation for [facility] by Debo Jeff.  07/14/22   13:53 EDT    Patient verbalized consent to the visit recording.

## 2022-07-19 ENCOUNTER — TELEPHONE (OUTPATIENT)
Dept: INTERNAL MEDICINE | Facility: CLINIC | Age: 42
End: 2022-07-19

## 2022-07-19 NOTE — TELEPHONE ENCOUNTER
Caller: Dawna Gamblesoledad SOLER    Relationship: Self    Best call back number: [phone]    What medications are you currently taking:   Current Outpatient Medications on File Prior to Visit   Medication Sig Dispense Refill   • buprenorphine-naloxone (SUBOXONE) 8-2 MG per SL tablet      • buPROPion XL (Wellbutrin XL) 300 MG 24 hr tablet Take 1 tablet by mouth Daily. 30 tablet 2   • citalopram (CeleXA) 40 MG tablet Take 1 tablet by mouth Daily. 30 tablet 2   • hydroCHLOROthiazide (HYDRODIURIL) 12.5 MG tablet Take 1 tablet by mouth Daily. (FLUID PILL) 30 tablet 5   • hydrOXYzine pamoate (VISTARIL) 25 MG capsule Take 1 capsule by mouth 2 (Two) Times a Day As Needed for Anxiety. 60 capsule 5   • ibuprofen (ADVIL,MOTRIN) 800 MG tablet Take 1 tablet by mouth 3 (Three) Times a Day As Needed for Moderate Pain . 90 tablet 5   • multivitamin (Multiple Vitamin) tablet tablet Take 1 tablet by mouth Daily. 30 tablet 11   • naloxone (NARCAN) 4 MG/0.1ML nasal spray      • ondansetron ODT (ZOFRAN-ODT) 4 MG disintegrating tablet Take 1 tablet by mouth Every 6 (Six) Hours As Needed for Nausea or Vomiting. 10 tablet 0   • rOPINIRole (REQUIP) 0.5 MG tablet TAKE ONE TABLET BY MOUTH EVERY NIGHT 1 HOUR BEFORE BEDTIME 30 tablet 1   • sertraline (Zoloft) 25 MG tablet Take 1 tablet by mouth Daily. 30 tablet 1   • traZODone (DESYREL) 50 MG tablet Take 1-2 tablets one hour before bedtime as needed for sleep. 45 tablet 2   • vitamin D (ERGOCALCIFEROL) 1.25 MG (81203 UT) capsule capsule Take 1 capsule by mouth 1 (One) Time Per Week. 4 capsule 2     No current facility-administered medications on file prior to visit.          When did you start taking these medications: HAS BEEN TAKING ALL OF IT FOR A LONG TIME    Which medication are you concerned about: CELEXA , ZOLOFT & WELLBUTRIN         Who prescribed you this medication: WILLIAM SANCHES    What are your concerns: WRONG DOSAGE SENT IN AND DIDN'T GET SOME OF THE MEDICATION    How long  have you had these concerns: YESTERDAY      IRAIS CARDOZA [facility] - [place], [place] - [address] - [phone]  - [phone] FX

## 2022-07-19 NOTE — TELEPHONE ENCOUNTER
During last visit we decided to stay on wellbutrin (300mg) and celexa (40mg)  and start weaning down the zoloft. Decreased dose of zoloft 25mg was sent to pharmacy on last visit. Other two meds were refilled at prior visit on 6/9. What did the pharmacy not fill?

## 2022-08-18 ENCOUNTER — OFFICE VISIT (OUTPATIENT)
Dept: INTERNAL MEDICINE | Facility: CLINIC | Age: 42
End: 2022-08-18

## 2022-08-18 VITALS
TEMPERATURE: 97 F | WEIGHT: 187 LBS | HEART RATE: 68 BPM | OXYGEN SATURATION: 97 % | HEIGHT: 66 IN | DIASTOLIC BLOOD PRESSURE: 68 MMHG | SYSTOLIC BLOOD PRESSURE: 104 MMHG | BODY MASS INDEX: 30.05 KG/M2 | RESPIRATION RATE: 16 BRPM

## 2022-08-18 DIAGNOSIS — G47.09 OTHER INSOMNIA: ICD-10-CM

## 2022-08-18 DIAGNOSIS — F41.8 DEPRESSION WITH ANXIETY: Primary | ICD-10-CM

## 2022-08-18 DIAGNOSIS — M79.89 LEG SWELLING: ICD-10-CM

## 2022-08-18 DIAGNOSIS — Z30.42 ENCOUNTER FOR SURVEILLANCE OF INJECTABLE CONTRACEPTIVE: ICD-10-CM

## 2022-08-18 PROCEDURE — 99214 OFFICE O/P EST MOD 30 MIN: CPT | Performed by: NURSE PRACTITIONER

## 2022-08-18 RX ORDER — MEDROXYPROGESTERONE ACETATE 150 MG/ML
150 INJECTION, SUSPENSION INTRAMUSCULAR
Qty: 1 EACH | Refills: 3 | Status: SHIPPED | OUTPATIENT
Start: 2022-08-18 | End: 2023-03-10

## 2022-08-18 RX ORDER — BUPROPION HYDROCHLORIDE 300 MG/1
300 TABLET ORAL DAILY
Qty: 30 TABLET | Refills: 5 | OUTPATIENT
Start: 2022-08-18 | End: 2023-02-21

## 2022-08-18 NOTE — PROGRESS NOTES
Subjective   Chief Complaint   Patient presents with   • Depression   • Anxiety     1 mth follow up      Emely Gamble is a 41 y.o. female.     The patient is here today for a follow-up visit. She states that she is doing well.    Depression with anxiety  The patient states that she never picked up Wellbutrin 300 mg. She is currently taking 2 Wellbutrin 150 mg, Celexa 40 mg, and Zoloft 25 mg. She is planning to taper off of Zoloft and continue Celexa.    Insomnia  The patient is currently taking trazodone as needed. She states that it helps with her insomnia.    Low blood pressure  Today, the patients blood pressure is mildly low. She states that she has not had a lot to drink today.    Leg swelling  Her leg swelling is stable with hydrochlorothiazide, but blood pressure is running low today.    Contraception  The patient is due for her Depo-Provera shot; she has been on it for about 1.5 years. She states  that she not get it every 3 months because it usually lasts about 5 months.    I have reviewed the following portions of the patient's history and confirmed they are accurate: allergies, current medications, past family history, past medical history, past social history, past surgical history, and problem list    I have personally completed the patient's review of systems.    Review of Systems   Constitutional: Positive for fatigue. Negative for activity change, appetite change, chills, diaphoresis, fever, unexpected weight gain and unexpected weight loss.   HENT: Negative for ear discharge, ear pain, mouth sores, nosebleeds, sinus pressure, sneezing and sore throat.    Eyes: Negative for pain, discharge and itching.   Respiratory: Negative for cough, chest tightness, shortness of breath and wheezing.    Cardiovascular: Negative for chest pain, palpitations and leg swelling.   Gastrointestinal: Negative for abdominal pain, constipation, diarrhea, nausea and vomiting.   Endocrine: Negative for heat  intolerance, polydipsia and polyphagia.   Genitourinary: Negative for dysuria, flank pain, frequency, hematuria and urgency.   Musculoskeletal: Positive for arthralgias, back pain and myalgias. Negative for gait problem, joint swelling, neck pain and neck stiffness.   Skin: Negative for color change, pallor and rash.   Allergic/Immunologic: Negative for immunocompromised state.   Neurological: Positive for numbness. Negative for seizures, speech difficulty and weakness.   Hematological: Negative for adenopathy.   Psychiatric/Behavioral: Positive for sleep disturbance. Negative for agitation, decreased concentration, suicidal ideas and depressed mood. The patient is not nervous/anxious.        Current Outpatient Medications on File Prior to Visit   Medication Sig   • buprenorphine-naloxone (SUBOXONE) 8-2 MG per SL tablet    • citalopram (CeleXA) 40 MG tablet Take 1 tablet by mouth Daily.   • hydroCHLOROthiazide (HYDRODIURIL) 12.5 MG tablet Take 1 tablet by mouth Daily. (FLUID PILL)   • hydrOXYzine pamoate (VISTARIL) 25 MG capsule Take 1 capsule by mouth 2 (Two) Times a Day As Needed for Anxiety.   • ibuprofen (ADVIL,MOTRIN) 800 MG tablet Take 1 tablet by mouth 3 (Three) Times a Day As Needed for Moderate Pain .   • multivitamin (Multiple Vitamin) tablet tablet Take 1 tablet by mouth Daily.   • naloxone (NARCAN) 4 MG/0.1ML nasal spray    • ondansetron ODT (ZOFRAN-ODT) 4 MG disintegrating tablet Take 1 tablet by mouth Every 6 (Six) Hours As Needed for Nausea or Vomiting.   • rOPINIRole (REQUIP) 0.5 MG tablet TAKE ONE TABLET BY MOUTH EVERY NIGHT 1 HOUR BEFORE BEDTIME   • sertraline (Zoloft) 25 MG tablet Take 1 tablet by mouth Daily.   • traZODone (DESYREL) 50 MG tablet Take 1-2 tablets one hour before bedtime as needed for sleep.   • vitamin D (ERGOCALCIFEROL) 1.25 MG (17814 UT) capsule capsule Take 1 capsule by mouth 1 (One) Time Per Week.     No current facility-administered medications on file prior to visit.  "      Objective   Vitals:    08/18/22 1304   BP: 104/68   BP Location: Left arm   Patient Position: Sitting   Cuff Size: Adult   Pulse: 68   Resp: 16   Temp: 97 °F (36.1 °C)   SpO2: 97%   Weight: 84.8 kg (187 lb)   Height: 167.6 cm (66\")   PainSc: 0-No pain     Body mass index is 30.18 kg/m².    Physical Exam  Vitals reviewed.   Constitutional:       Appearance: Normal appearance. She is well-developed.   HENT:      Head: Normocephalic and atraumatic.      Nose: Nose normal.   Eyes:      General: Lids are normal.      Conjunctiva/sclera: Conjunctivae normal.      Pupils: Pupils are equal, round, and reactive to light.   Neck:      Thyroid: No thyromegaly.      Trachea: Trachea normal.   Pulmonary:      Effort: Pulmonary effort is normal. No respiratory distress.   Skin:     General: Skin is warm and dry.   Neurological:      Mental Status: She is alert and oriented to person, place, and time.      GCS: GCS eye subscore is 4. GCS verbal subscore is 5. GCS motor subscore is 6.   Psychiatric:         Attention and Perception: Attention normal.         Mood and Affect: Mood normal.         Speech: Speech normal.         Behavior: Behavior normal. Behavior is cooperative.         Assessment & Plan   Problem List Items Addressed This Visit    None     Visit Diagnoses     Depression with anxiety    -  Primary    - Chronic, stable.   Continue Wellbutrin. Continue Celexa and Zoloft with plan to wean off Zoloft at next appointment. Continue hydroxyzine as needed.      Relevant Medications    buPROPion XL (Wellbutrin XL) 300 MG 24 hr tablet    Other insomnia        - Chronic, stable.   Continue trazodone as needed.      Leg swelling        - Chronic, stable.   Continue hydrochlorothiazide. Patient is hypotensive today. If this does not improve, we will discontinue hydrochlorothiazide.      Encounter for surveillance of injectable contraceptive      - Chronic, stable.   Continue Depo-Provera injections. Discussed with patient " that she will be referred to gynecology in the future to discuss continuing this or other alternatives.         Relevant Medications    medroxyPROGESTERone (Depo-Provera) 150 MG/ML injection          Current Outpatient Medications:   •  buprenorphine-naloxone (SUBOXONE) 8-2 MG per SL tablet, , Disp: , Rfl:   •  buPROPion XL (Wellbutrin XL) 300 MG 24 hr tablet, Take 1 tablet by mouth Daily., Disp: 30 tablet, Rfl: 5  •  citalopram (CeleXA) 40 MG tablet, Take 1 tablet by mouth Daily., Disp: 30 tablet, Rfl: 2  •  hydroCHLOROthiazide (HYDRODIURIL) 12.5 MG tablet, Take 1 tablet by mouth Daily. (FLUID PILL), Disp: 30 tablet, Rfl: 5  •  hydrOXYzine pamoate (VISTARIL) 25 MG capsule, Take 1 capsule by mouth 2 (Two) Times a Day As Needed for Anxiety., Disp: 60 capsule, Rfl: 5  •  ibuprofen (ADVIL,MOTRIN) 800 MG tablet, Take 1 tablet by mouth 3 (Three) Times a Day As Needed for Moderate Pain ., Disp: 90 tablet, Rfl: 5  •  multivitamin (Multiple Vitamin) tablet tablet, Take 1 tablet by mouth Daily., Disp: 30 tablet, Rfl: 11  •  naloxone (NARCAN) 4 MG/0.1ML nasal spray, , Disp: , Rfl:   •  ondansetron ODT (ZOFRAN-ODT) 4 MG disintegrating tablet, Take 1 tablet by mouth Every 6 (Six) Hours As Needed for Nausea or Vomiting., Disp: 10 tablet, Rfl: 0  •  rOPINIRole (REQUIP) 0.5 MG tablet, TAKE ONE TABLET BY MOUTH EVERY NIGHT 1 HOUR BEFORE BEDTIME, Disp: 30 tablet, Rfl: 1  •  sertraline (Zoloft) 25 MG tablet, Take 1 tablet by mouth Daily., Disp: 30 tablet, Rfl: 1  •  traZODone (DESYREL) 50 MG tablet, Take 1-2 tablets one hour before bedtime as needed for sleep., Disp: 45 tablet, Rfl: 2  •  vitamin D (ERGOCALCIFEROL) 1.25 MG (14819 UT) capsule capsule, Take 1 capsule by mouth 1 (One) Time Per Week., Disp: 4 capsule, Rfl: 2  •  medroxyPROGESTERone (Depo-Provera) 150 MG/ML injection, Inject 1 mL into the appropriate muscle as directed by prescriber Every 3 (Three) Months., Disp: 1 each, Rfl: 3       Plan of care reviewed with the patient  at the conclusion of today's visit.  Education was provided regarding diagnosis, management, and any prescribed or recommended OTC medications.  Patient verbalized understanding of and agreement with management plan.     Return in about 3 months (around 11/18/2022), or if symptoms worsen or fail to improve.      Transcribed from ambient dictation for [facility] by Tanya Cui.  08/18/22   13:15 EDT    Patient verbalized consent to the visit recording.

## 2022-08-30 NOTE — CONSULTS
"    Referring Provider: Dr. Cervantes  Reason for Consultation: Substance use      Chief complaint \"I have been using Suboxone\"    Subjective .     History of present illness:  The patient reports at 16 year history of opioid use and development of tolerance and withdrawal, and ongoing use despite multiple efforts to stop and negative consequences. She states that currently she is taking about 2 mg of Suboxone IV every other day. She is not prescribed this medication and buys it off the street. States that she is feeling better now but has had some cravings and withdrawals. She realizes that she is on a low dose, and her vitals are also stable at this time and states that she would like to stop completely but has had difficulty with cessation. She states that she has been in treatment in the past in Suboxone clinics and has done well, and is thinking that she will go back to Suboxone maintenance. She also reports using methamphetamines once a week, especially when she is not able to find Suboxone.    Review of Systems  Gen: no fever, chills  Resp: no soa  GI: No nvd  CVS: No cp    History  Past Medical History:   Diagnosis Date   • Hepatitis C virus    , History reviewed. No pertinent surgical history., History reviewed. No pertinent family history.,   Social History   Substance Use Topics   • Smoking status: Current Every Day Smoker     Packs/day: 0.25     Years: 10.00     Types: Cigarettes   • Smokeless tobacco: Never Used   • Alcohol use Not on file   ,   No prescriptions prior to admission.   , Scheduled Meds:    [START ON 7/10/2018] cefepime 2 g Intravenous Q12H   gentamicin 3 mg/kg (Ideal) Intravenous Q24H   heparin (porcine) 5,000 Units Subcutaneous Q12H   lactobacillus acidophilus 1 capsule Oral Daily   nicotine 1 patch Transdermal Daily   sodium chloride 1,000 mL Intravenous Once   , Continuous Infusions:    norepinephrine 0.02-0.3 mcg/kg/min Last Rate: Stopped (07/08/18 7533)   sodium chloride 100 mL/hr Last " Rate: 100 mL/hr (07/09/18 1311)   , PRN Meds:  •  acetaminophen  •  hydrOXYzine  •  ibuprofen  •  magnesium sulfate **OR** magnesium sulfate **OR** magnesium sulfate  •  pneumococcal polysaccharide 23-valent  •  potassium chloride **OR** potassium chloride **OR** potassium chloride  •  potassium phosphate infusion greater than 15 mMoles **OR** potassium phosphate infusion greater than 15 mMoles **OR** potassium phosphate **OR** sodium phosphate IVPB **OR** sodium phosphate IVPB **OR** sodium phosphate IVPB  •  sodium chloride  •  sodium chloride  •  sodium chloride  •  sodium chloride  •  sodium chloride  •  sodium chloride  •  sodium chloride  •  sodium chloride and Allergies:  Patient has no known allergies.    Objective     Vital Signs   Temp:  [98.2 °F (36.8 °C)-98.3 °F (36.8 °C)] 98.3 °F (36.8 °C)  Heart Rate:  [52-66] 63  Resp:  [11-23] 11  BP: ()/() 106/74    Mental Status Exam:   Mental Status Exam:    Hygiene:   fair  Cooperation:  Cooperative  Eye Contact:  Good  Psychomotor Behavior:  Appropriate  Affect:  Appropriate  Hopelessness: Denies  Speech:  Normal  Thought Progress:  Goal directed  Thought Content:  Normal  Suicidal:  None  Homicidal:  None  Hallucinations:  None  Delusion:  None  Memory:  Intact  Orientation:  Person, Place, Time and Situation  Reliability:  fair  Insight:  Fair  Judgement:  Fair  Impulse Control:  Fair    Results Review:   I reviewed the patient's new clinical results.  Lab Results (last 24 hours)     Procedure Component Value Units Date/Time    Phosphorus [[id number]]  (Abnormal) Collected:  07/09/18 1501    Specimen:  Blood Updated:  07/09/18 1537     Phosphorus 2.3 (L) mg/dL     Blood Culture - Blood, [[id number]] Collected:  07/09/18 1055    Specimen:  Blood from Hand, Left Updated:  07/09/18 1100    Blood Culture - Blood, [[id number]]  (Normal) Collected:  07/08/18 0803    Specimen:  Blood from Arm, Right Updated:  07/09/18 1030     Blood Culture No growth at 24  hours    Blood Culture - Blood, [[id number]] Collected:  07/09/18 1010    Specimen:  Blood from Hand, Right Updated:  07/09/18 1025    Magnesium [[id number]]  (Normal) Collected:  07/09/18 0743    Specimen:  Blood Updated:  07/09/18 0828     Magnesium 2.6 mg/dL     Hepatitis A Antibody, IgM [[id number]]  (Abnormal) Collected:  07/09/18 0059    Specimen:  Blood Updated:  07/09/18 0801     Hep A IgM Equivocal (A)    Urine Culture - Urine, [[id number]]  (Abnormal) Collected:  07/08/18 0814    Specimen:  Urine from Urine, Catheter Updated:  07/09/18 0639     Urine Culture >100,000 CFU/mL Gram Negative Bacilli, Morphology consistent with Escherichia / Citrobacter (A)    Blood Culture ID, PCR - Blood, [[id number]]  (Abnormal) Collected:  07/08/18 0755    Specimen:  Blood from Arm, Left Updated:  07/09/18 0444     BCID, PCR Enterobacter cloacae complex. Identification by BCID PCR. (C)    Blood Culture - Blood, [[id number]]  (Abnormal) Collected:  07/08/18 0755    Specimen:  Blood from Arm, Left Updated:  07/09/18 0441     Blood Culture Abnormal Stain (A)     Gram Stain Result Gram negative bacilli    C-reactive Protein [[id number]]  (Abnormal) Collected:  07/09/18 0059    Specimen:  Blood Updated:  07/09/18 0222     C-Reactive Protein 10.50 (H) mg/dL     Magnesium [[id number]]  (Normal) Collected:  07/09/18 0059    Specimen:  Blood Updated:  07/09/18 0215     Magnesium 2.4 mg/dL     Osmolality, Calculated [[id number]]  (Normal) Collected:  07/09/18 0059    Specimen:  Blood Updated:  07/09/18 0215     Osmolality Calc 275.8 mOsm/kg     Comprehensive Metabolic Panel [[id number]]  (Abnormal) Collected:  07/09/18 0059    Specimen:  Blood Updated:  07/09/18 0215     Glucose 119 (H) mg/dL      BUN 15 mg/dL      Creatinine 0.76 mg/dL      Sodium 137 mmol/L      Potassium 3.7 mmol/L      Chloride 114 (H) mmol/L      CO2 21.7 (L) mmol/L      Calcium 7.5 (L) mg/dL      Total Protein 5.0 (L) g/dL      Albumin 2.60 (L) g/dL      ALT (SGPT) 73 (H)  U/L      AST (SGOT) 47 (H) U/L      Alkaline Phosphatase 141 (H) U/L      Comment: Note New Reference Ranges        Total Bilirubin 0.6 mg/dL      eGFR Non African Amer 87 mL/min/1.73      Globulin 2.4 gm/dL      A/G Ratio 1.1 (L) g/dL      BUN/Creatinine Ratio 19.7     Anion Gap 1.3 (L) mmol/L     Phosphorus [[id number]]  (Abnormal) Collected:  07/09/18 0059    Specimen:  Blood Updated:  07/09/18 0212     Phosphorus 2.1 (L) mg/dL     CBC & Differential [[id number]] Collected:  07/09/18 0059    Specimen:  Blood Updated:  07/09/18 0158    Narrative:       The following orders were created for panel order CBC & Differential.  Procedure                               Abnormality         Status                     ---------                               -----------         ------                     CBC Auto Differential[[id number]]        Abnormal            Final result                 Please view results for these tests on the individual orders.    CBC Auto Differential [[id number]]  (Abnormal) Collected:  07/09/18 0059    Specimen:  Blood Updated:  07/09/18 0158     WBC 17.91 (H) 10*3/mm3      RBC 3.13 (L) 10*6/mm3      Hemoglobin 9.8 (L) g/dL      Hematocrit 31.1 (L) %      MCV 99.4 (H) fL      MCH 31.3 pg      MCHC 31.5 (L) g/dL      RDW 14.5 %      RDW-SD 49.6 fl      MPV 11.7 (H) fL      Platelets 102 (L) 10*3/mm3      Neutrophil % 78.7 (H) %      Lymphocyte % 16.1 (L) %      Monocyte % 4.4 %      Eosinophil % 0.4 %      Basophil % 0.2 %      Immature Grans % 0.2 %      Neutrophils, Absolute 14.10 (H) 10*3/mm3      Lymphocytes, Absolute 2.89 10*3/mm3      Monocytes, Absolute 0.78 10*3/mm3      Eosinophils, Absolute 0.07 10*3/mm3      Basophils, Absolute 0.03 10*3/mm3      Immature Grans, Absolute 0.04 (H) 10*3/mm3     Phosphorus [[id number]]  (Abnormal) Collected:  07/08/18 2106    Specimen:  Blood Updated:  07/08/18 2336     Phosphorus 2.4 (L) mg/dL         Imaging Results (last 24 hours)     Procedure Component  Value Units Date/Time    XR Chest 1 View [[id number]] Collected:  07/08/18 1953     Updated:  07/08/18 1956    Narrative:       XR CHEST 1 VW-     CLINICAL INDICATION: central line placement; A41.9-Sepsis, unspecified  organism; N39.0-Urinary tract infection, site not specified          COMPARISON: 07/08/2018      TECHNIQUE: Single frontal view of the chest.     FINDINGS:     Right-sided central line is been placed from a subclavian approach. The  tip is in the superior vena cava. There is no pneumothorax  The cardiac silhouette is normal. The pulmonary vasculature is  unremarkable.  There is no evidence of an acute osseous abnormality.   There are no suspicious-appearing parenchymal soft tissue nodules.            Impression:       Central line tip in the superior vena cava         This report was finalized on 7/8/2018 7:54 PM by Dr. Jose Manuel Lozada MD.               Assessment/Plan     Principal Problem:    Severe sepsis (CMS/HCC)  Active Problems:    Bacteremia    Acute pyelonephritis     Opioid use severe, F11.20  Methamphetamine use moderate F11.10    The patient doesn't appear to be in withdrawals at this time and is not likely to develop severe withdrawals if her report of taking 2 mg Suboxone every other day is correct. She is also not interested in completely stopping and wants to continue treatment in a Suboxone clinic. She has been on medication assisted treatment in the past and plans to resume the treatment once she leaves the hospital.     I discussed the patients findings and my recommendations with patient and nursing staff    Hubert Moseley MD  07/09/18  5:18 PM         01-Jan-1983

## 2022-09-28 DIAGNOSIS — G25.81 RESTLESS LEG: ICD-10-CM

## 2022-09-28 DIAGNOSIS — E55.9 VITAMIN D DEFICIENCY: ICD-10-CM

## 2022-09-29 RX ORDER — ROPINIROLE 0.5 MG/1
TABLET, FILM COATED ORAL
Qty: 30 TABLET | Refills: 1 | OUTPATIENT
Start: 2022-09-29 | End: 2023-02-21

## 2022-09-29 RX ORDER — ERGOCALCIFEROL 1.25 MG/1
CAPSULE ORAL
Qty: 4 CAPSULE | Refills: 2 | Status: SHIPPED | OUTPATIENT
Start: 2022-09-29 | End: 2023-03-10

## 2022-11-15 DIAGNOSIS — F41.8 DEPRESSION WITH ANXIETY: ICD-10-CM

## 2022-11-15 DIAGNOSIS — G47.09 OTHER INSOMNIA: ICD-10-CM

## 2022-11-15 RX ORDER — TRAZODONE HYDROCHLORIDE 50 MG/1
TABLET ORAL
Qty: 45 TABLET | Refills: 2 | Status: SHIPPED | OUTPATIENT
Start: 2022-11-15 | End: 2023-03-10

## 2022-11-15 RX ORDER — CITALOPRAM 40 MG/1
TABLET ORAL
Qty: 30 TABLET | Refills: 2 | Status: SHIPPED | OUTPATIENT
Start: 2022-11-15 | End: 2023-01-04

## 2023-01-03 DIAGNOSIS — M79.89 LEG SWELLING: ICD-10-CM

## 2023-01-03 DIAGNOSIS — F41.8 DEPRESSION WITH ANXIETY: ICD-10-CM

## 2023-01-04 RX ORDER — HYDROCHLOROTHIAZIDE 12.5 MG/1
TABLET ORAL
Qty: 30 TABLET | Refills: 5 | Status: SHIPPED | OUTPATIENT
Start: 2023-01-04 | End: 2023-04-07

## 2023-01-04 RX ORDER — CITALOPRAM 40 MG/1
TABLET ORAL
Qty: 90 TABLET | Refills: 2 | Status: SHIPPED | OUTPATIENT
Start: 2023-01-04 | End: 2023-02-22 | Stop reason: SDUPTHER

## 2023-02-21 ENCOUNTER — TELEPHONE (OUTPATIENT)
Dept: INTERNAL MEDICINE | Facility: CLINIC | Age: 43
End: 2023-02-21

## 2023-02-21 NOTE — TELEPHONE ENCOUNTER
HUB CAN READ AND SCHEDULE    CALLED PT TO CANCEL/RESCHEDULE APPOINTMENT WITH WILLIAM SANCHES ON 2/21/23. PROVIDER IS OUT OF OFFICE. PT DID NOT ANSWER. LEFT VOICEMAIL REQUESTING CALL BACK TO SCHEDULE.

## 2023-02-22 ENCOUNTER — TELEPHONE (OUTPATIENT)
Dept: INTERNAL MEDICINE | Facility: CLINIC | Age: 43
End: 2023-02-22
Payer: MEDICAID

## 2023-02-22 DIAGNOSIS — F41.8 DEPRESSION WITH ANXIETY: ICD-10-CM

## 2023-02-22 RX ORDER — BUPROPION HYDROCHLORIDE 150 MG/1
150 TABLET ORAL EVERY MORNING
Qty: 30 TABLET | Refills: 2 | Status: SHIPPED | OUTPATIENT
Start: 2023-02-22 | End: 2023-03-10 | Stop reason: SDUPTHER

## 2023-02-22 RX ORDER — CITALOPRAM 40 MG/1
40 TABLET ORAL DAILY
Qty: 90 TABLET | Refills: 2 | Status: SHIPPED | OUTPATIENT
Start: 2023-02-22 | End: 2023-03-10 | Stop reason: SDUPTHER

## 2023-02-22 NOTE — TELEPHONE ENCOUNTER
Attempted to contact Pt several times to offer her a same day appt today. No answer and mailbox fulll.

## 2023-02-22 NOTE — TELEPHONE ENCOUNTER
Pt called the office and was upset about having to cancel her appt yesterday with anh. The pt is having daytime sleepiness and wanted to know what she should do for it. The pt said she could only come in after 3:45pm, but anh had nothing until the end of march which she didn't want.

## 2023-03-10 ENCOUNTER — OFFICE VISIT (OUTPATIENT)
Dept: INTERNAL MEDICINE | Facility: CLINIC | Age: 43
End: 2023-03-10
Payer: MEDICAID

## 2023-03-10 VITALS
WEIGHT: 199 LBS | HEIGHT: 66 IN | OXYGEN SATURATION: 97 % | BODY MASS INDEX: 31.98 KG/M2 | DIASTOLIC BLOOD PRESSURE: 82 MMHG | HEART RATE: 67 BPM | SYSTOLIC BLOOD PRESSURE: 136 MMHG | TEMPERATURE: 97.3 F

## 2023-03-10 DIAGNOSIS — F41.8 DEPRESSION WITH ANXIETY: ICD-10-CM

## 2023-03-10 DIAGNOSIS — G47.10 EXCESSIVE SLEEPINESS: Primary | ICD-10-CM

## 2023-03-10 DIAGNOSIS — E66.09 CLASS 1 OBESITY DUE TO EXCESS CALORIES WITH SERIOUS COMORBIDITY AND BODY MASS INDEX (BMI) OF 32.0 TO 32.9 IN ADULT: ICD-10-CM

## 2023-03-10 DIAGNOSIS — F51.01 PRIMARY INSOMNIA: ICD-10-CM

## 2023-03-10 PROCEDURE — 99214 OFFICE O/P EST MOD 30 MIN: CPT | Performed by: INTERNAL MEDICINE

## 2023-03-10 RX ORDER — BUPROPION HYDROCHLORIDE 300 MG/1
300 TABLET ORAL EVERY MORNING
Qty: 30 TABLET | Refills: 2 | Status: SHIPPED | OUTPATIENT
Start: 2023-03-10

## 2023-03-10 RX ORDER — BUSPIRONE HYDROCHLORIDE 5 MG/1
5 TABLET ORAL 3 TIMES DAILY
Qty: 60 TABLET | Refills: 1 | Status: SHIPPED | OUTPATIENT
Start: 2023-03-10

## 2023-03-10 RX ORDER — SEMAGLUTIDE 0.25 MG/.5ML
0.25 INJECTION, SOLUTION SUBCUTANEOUS
Qty: 0.5 ML | Refills: 2 | Status: SHIPPED | OUTPATIENT
Start: 2023-03-10 | End: 2023-04-07

## 2023-03-10 RX ORDER — CITALOPRAM 20 MG/1
20 TABLET ORAL DAILY
Qty: 30 TABLET | Refills: 2 | Status: SHIPPED | OUTPATIENT
Start: 2023-03-10

## 2023-03-10 RX ORDER — QUETIAPINE FUMARATE 25 MG/1
25 TABLET, FILM COATED ORAL NIGHTLY
Qty: 30 TABLET | Refills: 1 | Status: SHIPPED | OUTPATIENT
Start: 2023-03-10 | End: 2023-04-07 | Stop reason: SDUPTHER

## 2023-03-10 NOTE — PROGRESS NOTES
Office Note      Date: 03/10/2023  Patient Name: Emely Gamble  MRN: [id number]  : 1980    Chief Complaint   Patient presents with   • Fatigue   • Memory Loss     Forgetful and confused       History of Present Illness: Emely Gamble is a 42 y.o. female who presents for Fatigue and Memory Loss (Forgetful and confused). Sx x 6 months.   Quit all meds last week then resumed 1/2 tabs a few days ago.   Has vistaril but not taking it past few days.  Has anhedonia. Sleeps >8 hours per day and does not feel well rested.   Having difficulty sleeping.   Would like weight loss meds.   Subjective      Review of Systems:   Pertinent review of systems per HPI.    Review of Systems   Constitutional: Positive for fatigue. Negative for activity change, appetite change, chills, diaphoresis, fever and unexpected weight change.   HENT: Negative for congestion, dental problem, drooling, ear discharge, ear pain, facial swelling, hearing loss and mouth sores.    Eyes: Negative for pain, discharge and itching.   Respiratory: Negative for apnea, cough, choking, chest tightness and shortness of breath.    Cardiovascular: Negative for chest pain, palpitations and leg swelling.   Gastrointestinal: Negative for abdominal distention, abdominal pain, blood in stool, constipation and diarrhea.   Endocrine: Negative for cold intolerance, heat intolerance, polydipsia and polyuria.   Genitourinary: Negative for difficulty urinating, dysuria, frequency and hematuria.   Skin: Negative for color change, pallor, rash and wound.   Allergic/Immunologic: Negative for environmental allergies, food allergies and immunocompromised state.   Neurological: Negative for dizziness, weakness and light-headedness.   Psychiatric/Behavioral: Positive for sleep disturbance. Negative for agitation, behavioral problems, confusion, decreased concentration and self-injury. The patient is not nervous/anxious.    All other systems reviewed and are  "negative.    No Known Allergies    Objective     Physical Exam:  Vital Signs:   Vitals:    03/10/23 1556   BP: 136/82   Pulse: 67   Temp: 97.3 °F (36.3 °C)   SpO2: 97%   Weight: 90.3 kg (199 lb)   Height: 167.6 cm (66\")   PainSc: 0-No pain      Body mass index is 32.12 kg/m².    Physical Exam  Vitals and nursing note reviewed.   Constitutional:       General: She is not in acute distress.     Appearance: She is well-developed.   HENT:      Head: Normocephalic and atraumatic.      Right Ear: External ear normal.      Left Ear: External ear normal.   Eyes:      General: No scleral icterus.        Right eye: No discharge.         Left eye: No discharge.      Conjunctiva/sclera: Conjunctivae normal.   Cardiovascular:      Rate and Rhythm: Normal rate and regular rhythm.      Heart sounds: Normal heart sounds. No murmur heard.    No friction rub. No gallop.   Pulmonary:      Effort: Pulmonary effort is normal. No respiratory distress.      Breath sounds: Normal breath sounds. No wheezing or rales.   Skin:     General: Skin is warm and dry.      Coloration: Skin is not pale.         Assessment / Plan      Assessment & Plan:    1. Excessive sleepiness  Referral to sleep medicine as this has been going on for 6 months.  Differentials include uncontrolled depression and anxiety  - Ambulatory Referral to Sleep Medicine    2. Depression with anxiety  Increase Wellbutrin because it is more activating.  Wean down on Celexa.  Stop Vistaril as this can cause drowsiness and start BuSpar as needed for anxiety  - buPROPion XL (WELLBUTRIN XL) 300 MG 24 hr tablet; Take 1 tablet by mouth Every Morning.  Dispense: 30 tablet; Refill: 2  - citalopram (CeleXA) 20 MG tablet; Take 1 tablet by mouth Daily.  Dispense: 30 tablet; Refill: 2  - Ambulatory Referral to Psychiatry  - busPIRone (BUSPAR) 5 MG tablet; Take 1 tablet by mouth 3 (Three) Times a Day.  Dispense: 60 tablet; Refill: 1    3. Primary insomnia  Trial of Seroquel to help with " sleep.  - QUEtiapine (SEROquel) 25 MG tablet; Take 1 tablet by mouth Every Night.  Dispense: 30 tablet; Refill: 1    4. Class 1 obesity due to excess calories with serious comorbidity and body mass index (BMI) of 32.0 to 32.9 in adult   start Wegovy, follow-up in 4 weeks  - Semaglutide-Weight Management (Wegovy) 0.25 MG/0.5ML solution auto-injector; Inject 0.25 mg under the skin into the appropriate area as directed Every 7 (Seven) Days.  Dispense: 0.5 mL; Refill: 2      Johanne Gómez MD  03/10/2023

## 2023-03-13 ENCOUNTER — TELEPHONE (OUTPATIENT)
Dept: INTERNAL MEDICINE | Facility: CLINIC | Age: 43
End: 2023-03-13
Payer: MEDICAID

## 2023-03-20 NOTE — TELEPHONE ENCOUNTER
Pt would like to know if anything cheaper could be called in, I informed her I dont think there is anything else, but she wanted me to check, please advise.

## 2023-03-20 NOTE — TELEPHONE ENCOUNTER
This was prescribed by dr. Gómez but her insurance will not cover any of the injectable medications. She has normal blood sugar and her insurance will not cover any of the diabetic meds that are being used for weight loss.

## 2023-03-23 ENCOUNTER — TELEPHONE (OUTPATIENT)
Dept: INTERNAL MEDICINE | Facility: CLINIC | Age: 43
End: 2023-03-23

## 2023-03-23 NOTE — TELEPHONE ENCOUNTER
Caller: KIERA    Relationship to patient: Other    Best call back number: [phone]    Patient is needing: KIERA WITH [facility] CALLED TO FOLLOW UP ON THE PRIOR AUTHORIZATION FOR WEGOVY THAT WAS CREATED ON 3/15/23. SHE STATES THAT THEY HAVE NOT HEARD ANYTHING BACK ON THIS YET AND THEY TRIED REACHING OUT TO [facility] AND THEY WERE UNABLE TO LOCATE THE PATIENT'S DEMOGRAPHICS IN THEIR SYSTEM.    IF THIS MEDICATION IS STILL NEEDED, SHE REQUESTED WE CONTACT [facility] DIRECTLY [phone] OR TO CALL [facility] AND REFERENCE: [id number]

## 2023-04-07 ENCOUNTER — OFFICE VISIT (OUTPATIENT)
Dept: INTERNAL MEDICINE | Facility: CLINIC | Age: 43
End: 2023-04-07
Payer: MEDICAID

## 2023-04-07 VITALS
WEIGHT: 196 LBS | DIASTOLIC BLOOD PRESSURE: 90 MMHG | HEIGHT: 66 IN | BODY MASS INDEX: 31.5 KG/M2 | OXYGEN SATURATION: 98 % | SYSTOLIC BLOOD PRESSURE: 140 MMHG | HEART RATE: 62 BPM

## 2023-04-07 DIAGNOSIS — Z30.9 ENCOUNTER FOR CONTRACEPTIVE MANAGEMENT, UNSPECIFIED TYPE: Primary | ICD-10-CM

## 2023-04-07 DIAGNOSIS — F51.01 PRIMARY INSOMNIA: ICD-10-CM

## 2023-04-07 PROCEDURE — 1160F RVW MEDS BY RX/DR IN RCRD: CPT | Performed by: INTERNAL MEDICINE

## 2023-04-07 PROCEDURE — 99213 OFFICE O/P EST LOW 20 MIN: CPT | Performed by: INTERNAL MEDICINE

## 2023-04-07 PROCEDURE — 1159F MED LIST DOCD IN RCRD: CPT | Performed by: INTERNAL MEDICINE

## 2023-04-07 RX ORDER — MEDROXYPROGESTERONE ACETATE 150 MG/ML
150 INJECTION, SUSPENSION INTRAMUSCULAR
Qty: 1 ML | Refills: 2 | Status: SHIPPED | OUTPATIENT
Start: 2023-04-07

## 2023-04-07 RX ORDER — QUETIAPINE FUMARATE 50 MG/1
50 TABLET, FILM COATED ORAL NIGHTLY
Qty: 30 TABLET | Refills: 2 | Status: SHIPPED | OUTPATIENT
Start: 2023-04-07

## 2023-04-07 NOTE — PROGRESS NOTES
"     Office Note      Date: 2023  Patient Name: Emely Gamble  MRN: [id number]  : 1980    Chief Complaint   Patient presents with   • Excessive sleepiness     Follow up       History of Present Illness: Emely Gamble is a 42 y.o. female who presents for Excessive sleepiness (Follow up). Sx somewhat improved from last visit.     Subjective      Review of Systems:   Pertinent review of systems per HPI.    Review of Systems   Constitutional: Negative for activity change, appetite change, chills, diaphoresis, fatigue, fever and unexpected weight change.   HENT: Negative for congestion, dental problem, drooling, ear discharge, ear pain, facial swelling, hearing loss and mouth sores.    Eyes: Negative for pain, discharge and itching.   Respiratory: Negative for apnea, cough, choking, chest tightness and shortness of breath.    Cardiovascular: Negative for chest pain, palpitations and leg swelling.   Gastrointestinal: Negative for abdominal distention, abdominal pain, blood in stool, constipation and diarrhea.   Endocrine: Negative for cold intolerance, heat intolerance, polydipsia and polyuria.   Genitourinary: Negative for difficulty urinating, dysuria, frequency and hematuria.   Skin: Negative for color change, pallor, rash and wound.   Allergic/Immunologic: Negative for environmental allergies, food allergies and immunocompromised state.   Neurological: Negative for dizziness, weakness and light-headedness.   Psychiatric/Behavioral: Negative for agitation, behavioral problems, confusion, decreased concentration and self-injury. The patient is not nervous/anxious.    All other systems reviewed and are negative.    No Known Allergies    Objective     Physical Exam:  Vital Signs:   Vitals:    23 1554   BP: 140/90   Pulse: 62   SpO2: 98%   Weight: 88.9 kg (196 lb)   Height: 167.6 cm (66\")      Body mass index is 31.64 kg/m².    Physical Exam  Vitals and nursing note reviewed. "   Constitutional:       General: She is not in acute distress.     Appearance: She is well-developed.   HENT:      Head: Normocephalic and atraumatic.      Right Ear: External ear normal.      Left Ear: External ear normal.   Eyes:      General: No scleral icterus.        Right eye: No discharge.         Left eye: No discharge.      Conjunctiva/sclera: Conjunctivae normal.   Cardiovascular:      Rate and Rhythm: Normal rate and regular rhythm.      Heart sounds: Normal heart sounds. No murmur heard.    No friction rub. No gallop.   Pulmonary:      Effort: Pulmonary effort is normal. No respiratory distress.      Breath sounds: Normal breath sounds. No wheezing or rales.   Skin:     General: Skin is warm and dry.      Coloration: Skin is not pale.         Assessment / Plan      Assessment & Plan:    1. Primary insomnia  Inc seroquel  - QUEtiapine (SEROquel) 50 MG tablet; Take 1 tablet by mouth Every Night.  Dispense: 30 tablet; Refill: 2    2. Encounter for contraceptive management, unspecified type    - medroxyPROGESTERone (DEPO-PROVERA) 150 MG/ML injection; Inject 1 mL into the appropriate muscle as directed by prescriber Every 3 (Three) Months.  Dispense: 1 mL; Refill: 2      Johanne Gómez MD  04/07/2023

## 2023-04-28 RX ORDER — IBUPROFEN 800 MG/1
800 TABLET ORAL 3 TIMES DAILY PRN
Qty: 90 TABLET | Refills: 1 | Status: SHIPPED | OUTPATIENT
Start: 2023-04-28

## 2023-05-12 ENCOUNTER — OFFICE VISIT (OUTPATIENT)
Dept: INTERNAL MEDICINE | Facility: CLINIC | Age: 43
End: 2023-05-12

## 2023-05-12 VITALS
WEIGHT: 196 LBS | OXYGEN SATURATION: 98 % | SYSTOLIC BLOOD PRESSURE: 130 MMHG | DIASTOLIC BLOOD PRESSURE: 80 MMHG | BODY MASS INDEX: 31.5 KG/M2 | HEART RATE: 80 BPM | HEIGHT: 66 IN | TEMPERATURE: 97.8 F

## 2023-05-12 DIAGNOSIS — I10 ESSENTIAL HYPERTENSION: ICD-10-CM

## 2023-05-12 DIAGNOSIS — E66.09 CLASS 1 OBESITY DUE TO EXCESS CALORIES WITH SERIOUS COMORBIDITY AND BODY MASS INDEX (BMI) OF 31.0 TO 31.9 IN ADULT: Primary | ICD-10-CM

## 2023-05-12 PROCEDURE — 99213 OFFICE O/P EST LOW 20 MIN: CPT | Performed by: INTERNAL MEDICINE

## 2023-05-12 RX ORDER — BUPROPION HYDROCHLORIDE 150 MG/1
TABLET ORAL
COMMUNITY
Start: 2023-04-27 | End: 2023-05-12

## 2023-05-12 RX ORDER — ERGOCALCIFEROL 1.25 MG/1
CAPSULE ORAL
COMMUNITY
Start: 2023-04-27

## 2023-05-12 RX ORDER — HYDROCHLOROTHIAZIDE 12.5 MG/1
TABLET ORAL
COMMUNITY
Start: 2023-04-27

## 2023-05-12 RX ORDER — HYDROXYZINE PAMOATE 25 MG/1
CAPSULE ORAL
COMMUNITY
Start: 2023-04-27 | End: 2023-05-12

## 2023-05-12 RX ORDER — MEDROXYPROGESTERONE ACETATE 150 MG/ML
INJECTION, SUSPENSION INTRAMUSCULAR
COMMUNITY
Start: 2023-04-07

## 2023-05-12 NOTE — PROGRESS NOTES
"     Office Note      Date: 2023  Patient Name: Emely Gamble  MRN: [id number]  : 1980    Chief Complaint   Patient presents with   • Follow-up     4 week       History of Present Illness: Emely Gamble is a 42 y.o. female who presents for Follow-up (4 week). Has not been able to lose much weight despite exercise and diet.   Interested in weight loss medication.  Subjective      Review of Systems:   Pertinent review of systems per HPI.    Review of Systems   Constitutional: Negative for activity change, appetite change, chills, diaphoresis, fatigue, fever and unexpected weight change.   HENT: Negative for congestion, dental problem, drooling, ear discharge, ear pain, facial swelling, hearing loss and mouth sores.    Eyes: Negative for pain, discharge and itching.   Respiratory: Negative for apnea, cough, choking, chest tightness and shortness of breath.    Cardiovascular: Negative for chest pain, palpitations and leg swelling.   Gastrointestinal: Negative for abdominal distention, abdominal pain, blood in stool, constipation and diarrhea.   Endocrine: Negative for cold intolerance, heat intolerance, polydipsia and polyuria.   Genitourinary: Negative for difficulty urinating, dysuria, frequency and hematuria.   Skin: Negative for color change, pallor, rash and wound.   Allergic/Immunologic: Negative for environmental allergies, food allergies and immunocompromised state.   Neurological: Negative for dizziness, weakness and light-headedness.   Psychiatric/Behavioral: Negative for agitation, behavioral problems, confusion, decreased concentration and self-injury. The patient is not nervous/anxious.    All other systems reviewed and are negative.    No Known Allergies    Objective     Physical Exam:  Vital Signs:   Vitals:    23 1619   BP: 130/80   Pulse: 80   Temp: 97.8 °F (36.6 °C)   SpO2: 98%   Weight: 88.9 kg (196 lb)   Height: 167.6 cm (66\")   PainSc:   7   PainLoc: Leg      Body " mass index is 31.64 kg/m².    Physical Exam  Vitals and nursing note reviewed.   Constitutional:       General: She is not in acute distress.     Appearance: She is well-developed.   HENT:      Head: Normocephalic and atraumatic.      Right Ear: External ear normal.      Left Ear: External ear normal.   Eyes:      General: No scleral icterus.        Right eye: No discharge.         Left eye: No discharge.      Conjunctiva/sclera: Conjunctivae normal.   Cardiovascular:      Rate and Rhythm: Normal rate and regular rhythm.      Heart sounds: Normal heart sounds. No murmur heard.    No friction rub. No gallop.   Pulmonary:      Effort: Pulmonary effort is normal. No respiratory distress.      Breath sounds: Normal breath sounds. No wheezing or rales.   Skin:     General: Skin is warm and dry.      Coloration: Skin is not pale.         Assessment / Plan      Assessment & Plan:    1. Class 1 obesity due to excess calories with serious comorbidity and body mass index (BMI) of 31.0 to 31.9 in adult  Unfortunately with her insurance Wegovy is not covered.  Also discussed A1c normal.  Continue weight loss diet.  She will follow back up when she changes insurance plan    2. Essential hypertension  Chronic medical issues stable, continue HCTZ.  - hydroCHLOROthiazide (HYDRODIURIL) 12.5 MG tablet      Johanne Gómez MD  05/12/2023

## 2023-05-26 DIAGNOSIS — F51.01 PRIMARY INSOMNIA: ICD-10-CM

## 2023-05-26 RX ORDER — ERGOCALCIFEROL 1.25 MG/1
CAPSULE ORAL
Qty: 4 CAPSULE | OUTPATIENT
Start: 2023-05-26

## 2023-05-26 RX ORDER — QUETIAPINE FUMARATE 25 MG/1
TABLET, FILM COATED ORAL
Qty: 30 TABLET | Refills: 1 | OUTPATIENT
Start: 2023-05-26

## 2023-05-26 NOTE — TELEPHONE ENCOUNTER
Rx Refill Note  Requested Prescriptions     Pending Prescriptions Disp Refills   • QUEtiapine (SEROquel) 25 MG tablet [Pharmacy Med Name: QUEtiapine FUMARATE 25 MG TAB] 30 tablet 1     Sig: TAKE ONE TABLET BY MOUTH ONCE NIGHTLY      Last office visit with prescribing clinician: 5/12/2023   Last telemedicine visit with prescribing clinician: Visit date not found   Next office visit with prescribing clinician: Visit date not found                           Miguel Machuca MA  05/26/23, 08:05 EDT       Medication was sent in April with two refills. Refill not appropriate.

## 2023-05-26 NOTE — TELEPHONE ENCOUNTER
Vitamin D stable last year, have her make office visit for annual physical exam to have labs done to see if she needs more vitamin D

## 2023-07-27 DIAGNOSIS — F51.01 PRIMARY INSOMNIA: ICD-10-CM

## 2023-07-27 RX ORDER — QUETIAPINE FUMARATE 50 MG/1
50 TABLET, FILM COATED ORAL NIGHTLY
Qty: 30 TABLET | Refills: 2 | Status: SHIPPED | OUTPATIENT
Start: 2023-07-27

## 2023-09-08 DIAGNOSIS — I10 ESSENTIAL HYPERTENSION: ICD-10-CM

## 2023-09-08 RX ORDER — HYDROCHLOROTHIAZIDE 12.5 MG/1
TABLET ORAL
Qty: 30 TABLET | OUTPATIENT
Start: 2023-09-08

## 2023-10-24 NOTE — TELEPHONE ENCOUNTER
CAROL, CAN YOU REFILL HER DEPRESSION/ANIEXTY MEDS SHE TAKES THREE OF THEM. [facility].   as tolerated

## 2023-10-26 DIAGNOSIS — F51.01 PRIMARY INSOMNIA: ICD-10-CM

## 2023-10-26 RX ORDER — QUETIAPINE FUMARATE 50 MG/1
50 TABLET, FILM COATED ORAL NIGHTLY
Qty: 30 TABLET | Refills: 2 | Status: SHIPPED | OUTPATIENT
Start: 2023-10-26

## 2023-11-02 ENCOUNTER — OFFICE VISIT (OUTPATIENT)
Dept: INTERNAL MEDICINE | Facility: CLINIC | Age: 43
End: 2023-11-02

## 2023-11-02 VITALS
TEMPERATURE: 98.1 F | RESPIRATION RATE: 20 BRPM | OXYGEN SATURATION: 98 % | BODY MASS INDEX: 30.7 KG/M2 | SYSTOLIC BLOOD PRESSURE: 126 MMHG | DIASTOLIC BLOOD PRESSURE: 80 MMHG | WEIGHT: 191 LBS | HEIGHT: 66 IN | HEART RATE: 76 BPM

## 2023-11-02 DIAGNOSIS — Z12.31 ENCOUNTER FOR SCREENING MAMMOGRAM FOR MALIGNANT NEOPLASM OF BREAST: Primary | ICD-10-CM

## 2023-11-02 DIAGNOSIS — Z23 NEED FOR INFLUENZA VACCINATION: ICD-10-CM

## 2023-11-02 DIAGNOSIS — Z11.51 SCREENING FOR HPV (HUMAN PAPILLOMAVIRUS): ICD-10-CM

## 2023-11-02 DIAGNOSIS — F41.8 DEPRESSION WITH ANXIETY: ICD-10-CM

## 2023-11-02 RX ORDER — SERTRALINE HYDROCHLORIDE 25 MG/1
TABLET, FILM COATED ORAL
Qty: 20 TABLET | Refills: 0 | Status: SHIPPED | OUTPATIENT
Start: 2023-11-02

## 2023-11-02 RX ORDER — BUPROPION HYDROCHLORIDE 150 MG/1
450 TABLET ORAL DAILY
Qty: 180 TABLET | Refills: 2 | Status: SHIPPED | OUTPATIENT
Start: 2023-11-02

## 2023-11-02 NOTE — PROGRESS NOTES
Female Physical Note      Date: 2023   Patient Name: Emely Gamble  : 1980   MRN: [id number]     Chief Complaint:    Chief Complaint   Patient presents with    Establish Care    Depression    Anxiety       History of Present Illness: Emely Gamble is a 43 y.o. female who is here today for their annual health maintenance and physical. Here to establish care with me.  She is have left foot pain.  Has been seeing podiatry for this issue .       Subjective      Review of Systems     I have reviewed the following portions of the patient's history and these were updated and discussed with the patient as appropriate: past family history, past medical history, past social history, past surgical history, and problem list.     Medications:     Current Outpatient Medications:     buprenorphine-naloxone (SUBOXONE) 8-2 MG per SL tablet, , Disp: , Rfl:     citalopram (CeleXA) 20 MG tablet, Take 1 tablet by mouth Daily., Disp: 30 tablet, Rfl: 2    ibuprofen (ADVIL,MOTRIN) 800 MG tablet, Take 1 tablet by mouth 3 (Three) Times a Day As Needed for Moderate Pain., Disp: 90 tablet, Rfl: 1    medroxyPROGESTERone (DEPO-PROVERA) 150 MG/ML injection, Inject 1 mL into the appropriate muscle as directed by prescriber Every 3 (Three) Months., Disp: 1 mL, Rfl: 2    medroxyPROGESTERone Acetate 150 MG/ML suspension prefilled syringe, , Disp: , Rfl:     QUEtiapine (SEROquel) 50 MG tablet, Take 1 tablet by mouth Every Night., Disp: 30 tablet, Rfl: 2    buPROPion XL (WELLBUTRIN XL) 150 MG 24 hr tablet, Take 3 tablets by mouth Daily., Disp: 180 tablet, Rfl: 2    sertraline (Zoloft) 25 MG tablet, Take 0.5 tab every 48 hours x 1 week. Then every 72 hours x 1 week, then every 4 days x 3 doses, then once a week x 1 dose and stop, Disp: 20 tablet, Rfl: 0    Allergies:   No Known Allergies    Immunizations:  There is no immunization history for the selected administration types on file for this patient.   Colorectal  "Screening:     Last Completed Colonoscopy       This patient has no relevant Health Maintenance data.           Pap:    Last Completed Pap Smear       This patient has no relevant Health Maintenance data.           Mammogram:    Last Completed Mammogram       This patient has no relevant Health Maintenance data.             CT for Smoker (Age 55-75, 30pk yr):    Bone Density/DEXA:   Hep C ( 2597-7600):   HIV: check today    Diet/Physical activity:exercises some. Diet is fair    Sexual Health: No  Last menstrual period: 4 years ago     Breast feeding:         PHQ-9 Depression Screening  PHQ-9 Total Score: 12            Intimate partner violence: (Screen on initial visit, pregnant women, women with injuries, older adult with injury or evidence of neglect):  Violence can be a problem in many people's lives, so I now ask every patient about trauma or abuse they may have experienced in a relationship.  Stress/Safety - Do you feel safe in your relationship? Yes  Afraid/Abused - Have you ever been in a relationship where you were threatened, hurt, or afraid? No  Friend/Family - If yes, are your friends aware you have been hurt?  Emergency Plan - Do you have a safe place to go and the resources you need in an emergency? Yes    Osteoporosis:  NA      Objective     Physical Exam:  Vital Signs:   Vitals:    23 1441   BP: 126/80   BP Location: Left arm   Patient Position: Sitting   Cuff Size: Adult   Pulse: 76   Resp: 20   Temp: 98.1 °F (36.7 °C)   TempSrc: Tympanic   SpO2: 98%   Weight: 86.6 kg (191 lb)   Height: 167.6 cm (66\")       Physical Exam  Constitutional:       General: She is not in acute distress.     Appearance: Normal appearance. She is not ill-appearing.   HENT:      Right Ear: Tympanic membrane normal.      Left Ear: Tympanic membrane normal.      Nose: No congestion or rhinorrhea.      Mouth/Throat:      Mouth: Mucous membranes are moist.      Pharynx: No oropharyngeal exudate.   Eyes:      Extraocular " Movements: Extraocular movements intact.      Conjunctiva/sclera: Conjunctivae normal.      Pupils: Pupils are equal, round, and reactive to light.   Cardiovascular:      Rate and Rhythm: Normal rate and regular rhythm.   Pulmonary:      Effort: Pulmonary effort is normal. No respiratory distress.      Breath sounds: Normal breath sounds.   Abdominal:      General: Abdomen is flat. Bowel sounds are normal.      Palpations: Abdomen is soft.      Tenderness: There is no abdominal tenderness.   Musculoskeletal:         General: No swelling.      Cervical back: Neck supple.      Right lower leg: No edema.      Left lower leg: No edema.   Skin:     Coloration: Skin is not jaundiced.      Findings: No rash.   Neurological:      General: No focal deficit present.      Mental Status: She is alert and oriented to person, place, and time.      Cranial Nerves: No cranial nerve deficit.   Psychiatric:         Mood and Affect: Mood normal.         Behavior: Behavior normal.         Thought Content: Thought content normal.         Procedures    Assessment / Plan      Assessment/Plan:   Diagnoses and all orders for this visit:    1. Encounter for screening mammogram for malignant neoplasm of breast (Primary)  -     Mammo Screening Digital Tomosynthesis Bilateral With CAD; Future    2. Depression with anxiety  -     buPROPion XL (WELLBUTRIN XL) 150 MG 24 hr tablet; Take 3 tablets by mouth Daily.  Dispense: 180 tablet; Refill: 2  -     sertraline (Zoloft) 25 MG tablet; Take 0.5 tab every 48 hours x 1 week. Then every 72 hours x 1 week, then every 4 days x 3 doses, then once a week x 1 dose and stop  Dispense: 20 tablet; Refill: 0    3. Screening for HPV (human papillomavirus)  -     Ambulatory Referral to Gynecology    4. Need for influenza vaccination  -     Fluzone >6 Months (7579-5335)         BMI is >= 30 and <35. (Class 1 Obesity). The following options were offered after discussion;: weight loss educational material (shared in  after visit summary)       Follow Up:   Return in about 6 months (around 5/2/2024).    Healthcare Maintenance:   Counseling provided on exercise, nutrition, age-appropriate screening test, and appropriate lab studies.   Emely Gamble voices understanding and acceptance of this advice and will call back with any further questions or concerns. AVS with preventive healthcare tips printed for patient.     Reviewed the following with the patient: [employer] educational material given to patient.          DO ETELVINA Sanchez

## 2024-01-08 ENCOUNTER — HOSPITAL ENCOUNTER (OUTPATIENT)
Dept: MAMMOGRAPHY | Facility: HOSPITAL | Age: 44
Discharge: HOME | End: 2024-01-08
Admitting: INTERNAL MEDICINE
Payer: COMMERCIAL

## 2024-01-08 DIAGNOSIS — Z12.31 ENCOUNTER FOR SCREENING MAMMOGRAM FOR MALIGNANT NEOPLASM OF BREAST: ICD-10-CM

## 2024-01-08 PROCEDURE — 77063 BREAST TOMOSYNTHESIS BI: CPT

## 2024-01-08 PROCEDURE — 77067 SCR MAMMO BI INCL CAD: CPT

## 2024-01-20 DIAGNOSIS — F51.01 PRIMARY INSOMNIA: ICD-10-CM

## 2024-01-22 RX ORDER — QUETIAPINE FUMARATE 50 MG/1
50 TABLET, FILM COATED ORAL NIGHTLY
Qty: 90 TABLET | Refills: 1 | Status: SHIPPED | OUTPATIENT
Start: 2024-01-22

## 2024-03-28 DIAGNOSIS — F41.8 DEPRESSION WITH ANXIETY: ICD-10-CM

## 2024-03-28 RX ORDER — BUPROPION HYDROCHLORIDE 150 MG/1
450 TABLET ORAL DAILY
Qty: 180 TABLET | Refills: 2 | Status: SHIPPED | OUTPATIENT
Start: 2024-03-28

## 2024-05-15 ENCOUNTER — TELEPHONE (OUTPATIENT)
Dept: INTERNAL MEDICINE | Facility: CLINIC | Age: 44
End: 2024-05-15
Payer: COMMERCIAL

## 2024-05-22 ENCOUNTER — OFFICE VISIT (OUTPATIENT)
Dept: INTERNAL MEDICINE | Facility: CLINIC | Age: 44
End: 2024-05-22
Payer: COMMERCIAL

## 2024-05-22 VITALS
TEMPERATURE: 97.3 F | HEART RATE: 76 BPM | HEIGHT: 66 IN | WEIGHT: 190 LBS | SYSTOLIC BLOOD PRESSURE: 126 MMHG | DIASTOLIC BLOOD PRESSURE: 80 MMHG | OXYGEN SATURATION: 98 % | BODY MASS INDEX: 30.53 KG/M2

## 2024-05-22 DIAGNOSIS — F33.1 MAJOR DEPRESSIVE DISORDER, RECURRENT, MODERATE: Primary | ICD-10-CM

## 2024-05-22 DIAGNOSIS — F41.8 DEPRESSION WITH ANXIETY: ICD-10-CM

## 2024-05-22 DIAGNOSIS — Z30.9 ENCOUNTER FOR CONTRACEPTIVE MANAGEMENT, UNSPECIFIED TYPE: ICD-10-CM

## 2024-05-22 DIAGNOSIS — Z11.51 SCREENING FOR HPV (HUMAN PAPILLOMAVIRUS): ICD-10-CM

## 2024-05-22 PROCEDURE — 99213 OFFICE O/P EST LOW 20 MIN: CPT | Performed by: INTERNAL MEDICINE

## 2024-05-22 RX ORDER — MEDROXYPROGESTERONE ACETATE 150 MG/ML
150 INJECTION, SUSPENSION INTRAMUSCULAR
Qty: 1 ML | Refills: 2 | Status: SHIPPED | OUTPATIENT
Start: 2024-05-22

## 2024-05-22 RX ORDER — IBUPROFEN 800 MG/1
800 TABLET ORAL 3 TIMES DAILY PRN
Qty: 120 TABLET | Refills: 3 | Status: SHIPPED | OUTPATIENT
Start: 2024-05-22

## 2024-05-22 NOTE — PROGRESS NOTES
Follow Up Office Visit      Date: 2024   Patient Name: Emely Gamble  : 1980   MRN: [id number]     Chief Complaint:    Chief Complaint   Patient presents with    Follow-up    Depression       History of Present Illness: Emely Gamble is a 43 y.o. female who is here today to follow up with depression.  On seroquel and wellbutrin.  No longer on zoloft or celexa.  Celexa had issues with urinary retention.      Currently in walking boot for plantar fasciitis.  Seeing [facility].  Has been in boot for one week.            Subjective      Past Medical History:   Diagnosis Date    Depression     Hepatitis C virus        Past Surgical History:   Procedure Laterality Date     SECTION         Family History   Problem Relation Age of Onset    Breast cancer Mother     Hypertension Mother     Cancer Mother     Arthritis Mother     Hypertension Father     Ovarian cancer Neg Hx         Social History     Socioeconomic History    Marital status: Single   Tobacco Use    Smoking status: Former     Current packs/day: 0.00     Average packs/day: 1 pack/day for 10.0 years (10.0 ttl pk-yrs)     Types: Cigarettes     Start date: 2008     Quit date: 2018     Years since quittin.3    Smokeless tobacco: Never    Tobacco comments:     vape   Vaping Use    Vaping status: Every Day    Substances: Nicotine, Flavoring    Devices: Refillable tank   Substance and Sexual Activity    Alcohol use: Not Currently     Alcohol/week: 6.0 standard drinks of alcohol     Types: 6 Shots of liquor per week    Drug use: No    Sexual activity: Defer         I have reviewed the patients family history, social history, past medical history, past surgical history and have updated it as appropriate.     Medications:     Current Outpatient Medications:     buprenorphine-naloxone (SUBOXONE) 8-2 MG per SL tablet, , Disp: , Rfl:     buPROPion XL (WELLBUTRIN XL) 150 MG 24 hr tablet, Take 3 tablets by mouth  "Daily., Disp: 180 tablet, Rfl: 2    ibuprofen (ADVIL,MOTRIN) 800 MG tablet, Take 1 tablet by mouth 3 (Three) Times a Day As Needed for Moderate Pain., Disp: 90 tablet, Rfl: 1    medroxyPROGESTERone (DEPO-PROVERA) 150 MG/ML injection, Inject 1 mL into the appropriate muscle as directed by prescriber Every 3 (Three) Months., Disp: 1 mL, Rfl: 2    QUEtiapine (SEROquel) 50 MG tablet, TAKE ONE TABLET BY MOUTH ONCE NIGHTLY, Disp: 90 tablet, Rfl: 1    medroxyPROGESTERone Acetate 150 MG/ML suspension prefilled syringe, , Disp: , Rfl:     sertraline (Zoloft) 50 MG tablet, Take 1 tablet by mouth Daily., Disp: 30 tablet, Rfl: 3    Allergies:   No Known Allergies    Objective       Vital Signs:   Vitals:    05/22/24 1249   BP: 126/80   BP Location: Left arm   Patient Position: Sitting   Cuff Size: Adult   Pulse: 76   Temp: 97.3 °F (36.3 °C)   SpO2: 98%   Weight: 86.2 kg (190 lb)   Height: 167.6 cm (66\")     Body mass index is 30.67 kg/m².    Physical Exam:  Physical Exam  Constitutional:       Appearance: Normal appearance.   HENT:      Head: Normocephalic.   Eyes:      Conjunctiva/sclera: Conjunctivae normal.   Pulmonary:      Effort: Pulmonary effort is normal. No respiratory distress.   Skin:     Coloration: Skin is not jaundiced or pale.   Neurological:      Mental Status: She is alert and oriented to person, place, and time. Mental status is at baseline.   Psychiatric:         Mood and Affect: Mood normal.         Behavior: Behavior normal.         Thought Content: Thought content normal.         Procedures    Results:       Assessment / Plan      Assessment/Plan:   Diagnoses and all orders for this visit:    1. Major depressive disorder, recurrent, moderate (Primary)  -     sertraline (Zoloft) 50 MG tablet; Take 1 tablet by mouth Daily.  Dispense: 30 tablet; Refill: 3    2. Depression with anxiety       --start zoloft.  Plan to increase if still uncontrolled with depression.            Follow Up:   Return in about 4 weeks " (around 6/19/2024).    DO ETELVINA Sanchez

## 2024-05-24 DIAGNOSIS — F41.8 DEPRESSION WITH ANXIETY: ICD-10-CM

## 2024-05-24 RX ORDER — BUPROPION HYDROCHLORIDE 150 MG/1
450 TABLET ORAL DAILY
Qty: 180 TABLET | Refills: 2 | Status: SHIPPED | OUTPATIENT
Start: 2024-05-24

## 2024-05-24 NOTE — TELEPHONE ENCOUNTER
Caller: Emely Gamble    Relationship: Self    Best call back number: [phone]     Requested Prescriptions:   Requested Prescriptions     Pending Prescriptions Disp Refills    buPROPion XL (WELLBUTRIN XL) 150 MG 24 hr tablet 180 tablet 2     Sig: Take 3 tablets by mouth Daily.        Pharmacy where request should be sent: [facility] [id number] [address] [phone] [facility] [phone] FX     Last office visit with prescribing clinician: 5/22/2024   Last telemedicine visit with prescribing clinician: Visit date not found   Next office visit with prescribing clinician: 6/19/2024     Additional details provided by patient: PATIENT IS OUT    Does the patient have less than a 3 day supply:  [x] Yes  [] No      Betty Barreto Rep   05/24/24 09:37 EDT

## 2024-10-21 DIAGNOSIS — F51.01 PRIMARY INSOMNIA: ICD-10-CM

## 2024-10-21 RX ORDER — QUETIAPINE FUMARATE 50 MG/1
50 TABLET, FILM COATED ORAL NIGHTLY
Qty: 30 TABLET | Refills: 0 | Status: SHIPPED | OUTPATIENT
Start: 2024-10-21

## 2024-11-16 ENCOUNTER — APPOINTMENT (OUTPATIENT)
Dept: CT IMAGING | Facility: HOSPITAL | Age: 44
End: 2024-11-16
Payer: COMMERCIAL

## 2024-11-16 ENCOUNTER — HOSPITAL ENCOUNTER (EMERGENCY)
Facility: HOSPITAL | Age: 44
Discharge: HOME | End: 2024-11-16
Attending: STUDENT IN AN ORGANIZED HEALTH CARE EDUCATION/TRAINING PROGRAM
Payer: COMMERCIAL

## 2024-11-16 VITALS
BODY MASS INDEX: 29.16 KG/M2 | OXYGEN SATURATION: 98 % | WEIGHT: 175 LBS | HEART RATE: 71 BPM | TEMPERATURE: 98.7 F | HEIGHT: 65 IN | RESPIRATION RATE: 18 BRPM | DIASTOLIC BLOOD PRESSURE: 90 MMHG | SYSTOLIC BLOOD PRESSURE: 136 MMHG

## 2024-11-16 DIAGNOSIS — R60.0 PERIORBITAL EDEMA OF LEFT EYE: ICD-10-CM
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
## 2024-11-16 DIAGNOSIS — S01.112A LEFT EYELID LACERATION, INITIAL ENCOUNTER: ICD-10-CM

## 2024-11-16 LAB
FLUAV RNA RESP QL NAA+PROBE: NOT DETECTED
FLUBV RNA RESP QL NAA+PROBE: NOT DETECTED
RSV RNA RESP QL NAA+PROBE: NOT DETECTED
SARS-COV-2 RNA RESP QL NAA+PROBE: NOT DETECTED

## 2024-11-16 PROCEDURE — 72125 CT NECK SPINE W/O DYE: CPT

## 2024-11-16 PROCEDURE — 71250 CT THORAX DX C-: CPT

## 2024-11-16 PROCEDURE — 70450 CT HEAD/BRAIN W/O DYE: CPT

## 2024-11-16 PROCEDURE — 99284 EMERGENCY DEPT VISIT MOD MDM: CPT

## 2024-11-16 PROCEDURE — 70486 CT MAXILLOFACIAL W/O DYE: CPT

## 2024-11-16 PROCEDURE — 87637 SARSCOV2&INF A&B&RSV AMP PRB: CPT | Performed by: STUDENT IN AN ORGANIZED HEALTH CARE EDUCATION/TRAINING PROGRAM

## 2024-11-16 RX ORDER — METHOCARBAMOL 750 MG/1
750 TABLET, FILM COATED ORAL 3 TIMES DAILY
Qty: 9 TABLET | Refills: 0 | Status: SHIPPED | OUTPATIENT
Start: 2024-11-16 | End: 2024-11-19

## 2024-11-16 RX ORDER — IBUPROFEN 600 MG/1
600 TABLET, FILM COATED ORAL EVERY 6 HOURS PRN
Qty: 20 TABLET | Refills: 0 | Status: SHIPPED | OUTPATIENT
Start: 2024-11-16

## 2024-11-16 RX ORDER — METHOCARBAMOL 750 MG/1
750 TABLET, FILM COATED ORAL ONCE
Status: COMPLETED | OUTPATIENT
Start: 2024-11-16 | End: 2024-11-16

## 2024-11-16 RX ORDER — LIDOCAINE 50 MG/G
1 PATCH TOPICAL EVERY 24 HOURS
Qty: 6 PATCH | Refills: 0 | Status: SHIPPED | OUTPATIENT
Start: 2024-11-16

## 2024-11-16 RX ORDER — ACETAMINOPHEN 500 MG
1000 TABLET ORAL ONCE
Status: COMPLETED | OUTPATIENT
Start: 2024-11-16 | End: 2024-11-16

## 2024-11-16 RX ADMIN — METHOCARBAMOL 750 MG: 750 TABLET ORAL at 17:27

## 2024-11-16 RX ADMIN — ACETAMINOPHEN 1000 MG: 500 TABLET ORAL at 17:27

## 2024-11-16 NOTE — DISCHARGE INSTRUCTIONS
Use the provided pain medication to help with symptoms.  You will be contacted with the results of your COVID and flu swab.  If you develop productive cough fever or any other concerning symptoms you should be seen for repeat chest x-ray.

## 2024-11-16 NOTE — Clinical Note
[facility] EMERGENCY DEPARTMENT  [address]  [facility] [address]  Phone: [phone]    Emely Gamble was seen and treated in our emergency department on 11/16/2024.  She may return to work on 11/12/2024.         Thank you for choosing [facility].    Rylan Marrero MD

## 2024-11-16 NOTE — Clinical Note
[facility] EMERGENCY DEPARTMENT  [address]  [facility] [address]  Phone: [phone]    Emely Gamble was seen and treated in our emergency department on 11/16/2024.  She may return to work on 11/22/2024.         Thank you for choosing [facility].    Rylan Marrero MD
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
## 2024-11-18 ENCOUNTER — TELEPHONE (OUTPATIENT)
Dept: EMERGENCY DEPT | Facility: HOSPITAL | Age: 44
End: 2024-11-18
Payer: COMMERCIAL

## 2024-11-25 DIAGNOSIS — F51.01 PRIMARY INSOMNIA: ICD-10-CM

## 2024-11-25 RX ORDER — QUETIAPINE FUMARATE 50 MG/1
50 TABLET, FILM COATED ORAL NIGHTLY
Qty: 15 TABLET | Refills: 0 | Status: SHIPPED | OUTPATIENT
Start: 2024-11-25

## 2024-12-18 DIAGNOSIS — F51.01 PRIMARY INSOMNIA: ICD-10-CM

## 2024-12-18 RX ORDER — QUETIAPINE FUMARATE 50 MG/1
TABLET, FILM COATED ORAL
Qty: 15 TABLET | Refills: 0 | OUTPATIENT
Start: 2024-12-18

## 2025-01-14 ENCOUNTER — OFFICE VISIT (OUTPATIENT)
Dept: INTERNAL MEDICINE | Facility: CLINIC | Age: 45
End: 2025-01-14
Payer: COMMERCIAL

## 2025-01-14 VITALS
TEMPERATURE: 96.9 F | DIASTOLIC BLOOD PRESSURE: 78 MMHG | BODY MASS INDEX: 30.49 KG/M2 | HEIGHT: 65 IN | HEART RATE: 86 BPM | OXYGEN SATURATION: 96 % | WEIGHT: 183 LBS | SYSTOLIC BLOOD PRESSURE: 138 MMHG

## 2025-01-14 DIAGNOSIS — Z30.9 ENCOUNTER FOR CONTRACEPTIVE MANAGEMENT, UNSPECIFIED TYPE: ICD-10-CM

## 2025-01-14 DIAGNOSIS — F41.8 DEPRESSION WITH ANXIETY: ICD-10-CM

## 2025-01-14 DIAGNOSIS — F51.01 PRIMARY INSOMNIA: ICD-10-CM

## 2025-01-14 PROCEDURE — 90656 IIV3 VACC NO PRSV 0.5 ML IM: CPT | Performed by: PHYSICIAN ASSISTANT

## 2025-01-14 PROCEDURE — 90471 IMMUNIZATION ADMIN: CPT | Performed by: PHYSICIAN ASSISTANT

## 2025-01-14 PROCEDURE — 99214 OFFICE O/P EST MOD 30 MIN: CPT | Performed by: PHYSICIAN ASSISTANT

## 2025-01-14 RX ORDER — QUETIAPINE FUMARATE 50 MG/1
50 TABLET, FILM COATED ORAL NIGHTLY
Qty: 15 TABLET | Refills: 0 | Status: SHIPPED | OUTPATIENT
Start: 2025-01-14

## 2025-01-14 RX ORDER — MEDROXYPROGESTERONE ACETATE 150 MG/ML
150 INJECTION, SUSPENSION INTRAMUSCULAR
Qty: 1 ML | Refills: 2 | Status: SHIPPED | OUTPATIENT
Start: 2025-01-14

## 2025-01-14 RX ORDER — BUPROPION HYDROCHLORIDE 150 MG/1
450 TABLET ORAL DAILY
Qty: 180 TABLET | Refills: 2 | Status: SHIPPED | OUTPATIENT
Start: 2025-01-14

## 2025-01-14 RX ORDER — IBUPROFEN 600 MG/1
600 TABLET, FILM COATED ORAL EVERY 6 HOURS PRN
Qty: 20 TABLET | Refills: 0 | Status: SHIPPED | OUTPATIENT
Start: 2025-01-14

## 2025-01-28 DIAGNOSIS — F51.01 PRIMARY INSOMNIA: ICD-10-CM

## 2025-01-28 RX ORDER — QUETIAPINE FUMARATE 50 MG/1
TABLET, FILM COATED ORAL
Qty: 15 TABLET | Refills: 0 | Status: SHIPPED | OUTPATIENT
Start: 2025-01-28

## 2025-02-13 DIAGNOSIS — F51.01 PRIMARY INSOMNIA: ICD-10-CM

## 2025-02-13 RX ORDER — QUETIAPINE FUMARATE 50 MG/1
TABLET, FILM COATED ORAL
Qty: 15 TABLET | Refills: 0 | Status: SHIPPED | OUTPATIENT
Start: 2025-02-13

## 2025-03-03 RX ORDER — MEDROXYPROGESTERONE ACETATE 150 MG/ML
INJECTION, SUSPENSION INTRAMUSCULAR
Qty: 1 ML | Refills: 3 | Status: SHIPPED | OUTPATIENT
Start: 2025-03-03

## 2025-07-12 DIAGNOSIS — F41.8 DEPRESSION WITH ANXIETY: ICD-10-CM

## 2025-07-14 RX ORDER — BUPROPION HYDROCHLORIDE 150 MG/1
450 TABLET ORAL DAILY
Qty: 180 TABLET | Refills: 2 | Status: SHIPPED | OUTPATIENT
Start: 2025-07-14